# Patient Record
Sex: MALE | Race: WHITE | Employment: OTHER | URBAN - METROPOLITAN AREA
[De-identification: names, ages, dates, MRNs, and addresses within clinical notes are randomized per-mention and may not be internally consistent; named-entity substitution may affect disease eponyms.]

---

## 2017-06-19 ENCOUNTER — HOSPITAL ENCOUNTER (OUTPATIENT)
Dept: CT IMAGING | Age: 77
Discharge: HOME | End: 2017-06-19
Attending: OPHTHALMOLOGY

## 2017-06-19 DIAGNOSIS — H55.89 IRREGULAR EYE MOVEMENTS: ICD-10-CM

## 2017-06-19 DIAGNOSIS — Z98.890 STATUS POST EYE SURGERY: ICD-10-CM

## 2017-06-20 ENCOUNTER — HOSPITAL ENCOUNTER (OUTPATIENT)
Dept: CT IMAGING | Age: 77
Discharge: HOME | End: 2017-06-20
Attending: OPHTHALMOLOGY
Payer: MEDICARE

## 2017-06-20 DIAGNOSIS — Z98.890 STATUS POST EYE SURGERY: ICD-10-CM

## 2017-06-20 DIAGNOSIS — H55.89 IRREGULAR EYE MOVEMENTS: ICD-10-CM

## 2017-06-20 PROCEDURE — 70450 CT HEAD/BRAIN W/O DYE: CPT

## 2017-08-16 ENCOUNTER — HOSPITAL ENCOUNTER (EMERGENCY)
Age: 77
Discharge: HOME | End: 2017-08-16
Attending: EMERGENCY MEDICINE
Payer: MEDICARE

## 2017-08-16 VITALS
HEART RATE: 127 BPM | BODY MASS INDEX: 27.31 KG/M2 | HEIGHT: 67 IN | DIASTOLIC BLOOD PRESSURE: 97 MMHG | OXYGEN SATURATION: 96 % | SYSTOLIC BLOOD PRESSURE: 154 MMHG | WEIGHT: 174 LBS | RESPIRATION RATE: 14 BRPM | TEMPERATURE: 98.4 F

## 2017-08-16 DIAGNOSIS — M54.50 ACUTE LEFT-SIDED LOW BACK PAIN WITHOUT SCIATICA: Primary | ICD-10-CM

## 2017-08-16 PROCEDURE — 99282 EMERGENCY DEPT VISIT SF MDM: CPT

## 2017-08-16 RX ORDER — CYCLOBENZAPRINE HCL 10 MG
10 TABLET ORAL
Qty: 15 TAB | Refills: 0 | Status: SHIPPED | OUTPATIENT
Start: 2017-08-16 | End: 2017-08-23

## 2017-08-16 RX ORDER — ZINC GLUCONATE 10 MG
250 LOZENGE ORAL
COMMUNITY
End: 2019-08-14

## 2017-08-16 NOTE — ED TRIAGE NOTES
C/o left buttock pain for 1 week, states he was seen at [facility] one week ago for same complaint, given diclofenac with no relief. States he has been here before for same complaint and was dx'd with sciatica.

## 2017-08-16 NOTE — ED PROVIDER NOTES
[address]  EMERGENCY DEPARTMENT HISTORY AND PHYSICAL EXAM       Date: 8/16/2017   Patient Name: Eleanor Posey   YOB: 1940  Medical Record Number: [id number]    History of Presenting Illness     Chief Complaint   Patient presents with    Back Pain        History Provided By:  patient    Additional History: 11:39 AM  Eleanor Posey is a 78 y.o. male who presents to the emergency department C/O 9/10 lower left back pain onset 1 week ago. Pt states the pain may have come from twisting his back. Pain is alleviated with laying flat and exacerbated with moving. Pt explains that he went to [facility] last for the pain and they gave him a muscle relaxer which did not help. Pt was at this facility in the past with dx of sciatica and was given a medication which helped, but unsure of the name. Pt reports his HR is normally around 105. NKDA. PMHx of back problems, aneurysm, DM, kidney cysts, renal calculus, carotid atery aneurysm, PNA, and sciatica. PSHx of hernia, lithotripsy, and left ankle ligament. Pt is a non smoker and a non EtOH user. Pt denies trouble urinating, dysuria, bladder/bowel incontinence, weakness, leg pain, chest pain, SOB, palpitations, and any other associated signs or sx. Primary Care Provider: PROVIDER UNKNOWN   Specialist:    Past History     Past Medical History:   Past Medical History:   Diagnosis Date    Aneurysm ([address]     \" not sure where\"    Back problem     Carotid artery aneurysm ([address] 9/17/2014    Diabetes [facility] [address] 2007    Kidney cysts     Pneumonia     Renal calculus 2014    Rib contusion     Sciatica         Past Surgical History:   Past Surgical History:   Procedure Laterality Date    HX HERNIA REPAIR      HX LITHOTRIPSY  08/07/2014    HX ORTHOPAEDIC      left ankle ligament        Family History:   No family history on file.      Social History:   Social History   Substance Use Topics    Smoking status: Never Smoker    Smokeless tobacco: Never Used    Alcohol use No        Allergies:   No Known Allergies     Review of Systems   Review of Systems   Respiratory: Negative for shortness of breath. Cardiovascular: Negative for chest pain and palpitations. Genitourinary: Negative for difficulty urinating and dysuria. Musculoskeletal: Positive for back pain. (-) Leg pain   Neurological: Negative for weakness. (-) Bladder/bowel incontinence   All other systems reviewed and are negative. Physical Exam  Vitals:    08/16/17 1125   BP: (!) 154/97   Pulse: (!) 127   Resp: 14   Temp: 98.4 °F (36.9 °C)   SpO2: 96%   Weight: 78.9 kg (174 lb)   Height: 5' 7\" (1.702 m)       Physical Exam   Constitutional: He is oriented to person, place, and time. He appears well-developed and well-nourished. No distress. HENT:   Head: Normocephalic and atraumatic. Neck: Normal range of motion. Neck supple. Cardiovascular: Regular rhythm and normal heart sounds. Mildly tachycardic   Pulmonary/Chest: Effort normal and breath sounds normal. He exhibits no tenderness. Abdominal: Soft. He exhibits no distension. There is no tenderness. Musculoskeletal:        Back:    Neurological: He is alert and oriented to person, place, and time. Skin: Skin is warm and dry. No rash noted. He is not diaphoretic. No erythema. Psychiatric: He has a normal mood and affect. His behavior is normal.   Nursing note and vitals reviewed. Diagnostic Study Results     Labs -    No results found for this or any previous visit (from the past 12 hour(s)). Radiologic Studies -  The following have been ordered and reviewed:  No orders to display           Medical Decision Making   I am the first provider for this patient. I reviewed the vital signs, available nursing notes, past medical history, past surgical history, family history and social history. Vital Signs-Reviewed the patient's vital signs.    Patient Vitals for the past 12 hrs:   Temp Pulse Resp BP SpO2   08/16/17 1125 98.4 °F (36.9 °C) (!) 127 14 (!) 154/97 96 %       Pulse Oximetry Analysis - Normal 96% on room air     Old Medical Records: Nursing notes. MDM:   78year old male presents with a week of left buttock pain exacerbated by movement and pain free at rest. States he thinks he twisted it wrong, but no particular injury or fall. Had similar pain in 2013 for which I saw him in the ED and reported good relief with rx'ed Flexeril then. Denies any radicular sx, abdominal pain, midline spinous tenderness, rash, and is neurovascularly intact. He states his HR is always elevated. On recheck, it is 113. Review of previous notes including cardiologist notes show that his HR has regularly been in the 110s. Denies any CP, palpitations, SOB, hx of AFIB, syncope, or near syncope. There is also some question of aneurysm, but pt states this is incorrect, he was told by his cardiologist it is insignificant (CTA showed borderline thoracic aorta); sx not c/w with this. Procedures:   Procedures    ED Course:  11:39 AM  Initial assessment performed. The patients presenting problems have been discussed, and they are in agreement with the care plan formulated and outlined with them. I have encouraged them to ask questions as they arise throughout their visit. Medications Given in the ED:  Medications - No data to display    Discharge Note:  12:08 PM  Pt has been reexamined. Patient has no new complaints, changes, or physical findings. Care plan outlined and precautions discussed. Results were reviewed with the patient. All medications were reviewed with the patient; will d/c home with Flexiril. All of pt's questions and concerns were addressed. Patient was instructed and agrees to follow up with Ortho, as well as to return to the ED upon further deterioration. Patient is ready to go home. Diagnosis   Clinical Impression:   1.  Acute left-sided low back pain without sciatica         Follow-up Information     Follow up With Details Comments Contact Info    Linda Jiménez MD Schedule an appointment as soon as possible for a visit For Orthopedic Follow Up [address]  [address] [place] [address]      Mitch Belcher DO Schedule an appointment as soon as possible for a visit For Primary Care Follow Up [address]      [facility] EMERGENCY DE[facility] Go to As needed, If symptoms worsen [address] [place] [address]  [phone]          Discharge Medication List as of 8/16/2017 12:07 PM      START taking these medications    Details   cyclobenzaprine (FLEXERIL) 10 mg tablet Take 1 Tab by mouth three (3) times daily as needed for Muscle Spasm(s) for up to 7 days. , Print, Disp-15 Tab, R-0         CONTINUE these medications which have NOT CHANGED    Details   magnesium 250 mg tab Take 250 mg by mouth., Historical Med      metFORMIN (GLUCOPHAGE) 1,000 mg tablet Take 1,000 mg by mouth two (2) times daily (with meals). Historical Med, 1,000 mg      lisinopril (PRINIVIL, ZESTRIL) 5 mg tablet Take 5 mg by mouth daily. Historical Med, 5 mg      glipiZIDE SR (GLUCOTROL XL) 5 mg CR tablet Take  by mouth daily. Historical Med      aspirin 81 mg tablet Take 81 mg by mouth. Historical Med, 81 mg      co-enzyme Q-10 (CO Q-10) 100 mg capsule Take 100 mg by mouth daily. Historical Med, 100 mg             _______________________________   Attestations: This note is prepared by Mihaela Scott, acting as a Scribe for [facility] on 11:39 AM on 8/16/2017. [facility]: The scribe's documentation has been prepared under my direction and personally reviewed by me in its entirety.   _______________________________

## 2017-08-16 NOTE — DISCHARGE INSTRUCTIONS
Back Pain: Care Instructions  Your Care Instructions    Back pain has many possible causes. It is often related to problems with muscles and ligaments of the back. It may also be related to problems with the nerves, discs, or bones of the back. Moving, lifting, standing, sitting, or sleeping in an awkward way can strain the back. Sometimes you don't notice the injury until later. Arthritis is another common cause of back pain. Although it may hurt a lot, back pain usually improves on its own within several weeks. Most people recover in 12 weeks or less. Using good home treatment and being careful not to stress your back can help you feel better sooner. Follow-up care is a key part of your treatment and safety. Be sure to make and go to all appointments, and call your doctor if you are having problems. Its also a good idea to know your test results and keep a list of the medicines you take. How can you care for yourself at home? · Sit or lie in positions that are most comfortable and reduce your pain. Try one of these positions when you lie down:  ¨ Lie on your back with your knees bent and supported by large pillows. ¨ Lie on the floor with your legs on the seat of a sofa or chair. Jeff Amari on your side with your knees and hips bent and a pillow between your legs. ¨ Lie on your stomach if it does not make pain worse. · Do not sit up in bed, and avoid soft couches and twisted positions. Bed rest can help relieve pain at first, but it delays healing. Avoid bed rest after the first day of back pain. · Change positions every 30 minutes. If you must sit for long periods of time, take breaks from sitting. Get up and walk around, or lie in a comfortable position. · Try using a heating pad on a low or medium setting for 15 to 20 minutes every 2 or 3 hours. Try a warm shower in place of one session with the heating pad. · You can also try an ice pack for 10 to 15 minutes every 2 to 3 hours.  Put a thin cloth between the ice pack and your skin. · Take pain medicines exactly as directed. ¨ If the doctor gave you a prescription medicine for pain, take it as prescribed. ¨ If you are not taking a prescription pain medicine, ask your doctor if you can take an over-the-counter medicine. · Take short walks several times a day. You can start with 5 to 10 minutes, 3 or 4 times a day, and work up to longer walks. Walk on level surfaces and avoid hills and stairs until your back is better. · Return to work and other activities as soon as you can. Continued rest without activity is usually not good for your back. · To prevent future back pain, do exercises to stretch and strengthen your back and stomach. Learn how to use good posture, safe lifting techniques, and proper body mechanics. When should you call for help? Call your doctor now or seek immediate medical care if:  · You have new or worsening numbness in your legs. · You have new or worsening weakness in your legs. (This could make it hard to stand up.)  · You lose control of your bladder or bowels. Watch closely for changes in your health, and be sure to contact your doctor if:  · Your pain gets worse. · You are not getting better after 2 weeks. Where can you learn more? Go to http://hung-kassandra.info/. Enter I016 in the search box to learn more about \"Back Pain: Care Instructions. \"  Current as of: March 21, 2017  Content Version: 11.3  © 6814-0996 [employer]. Care instructions adapted under license by [employer] (which disclaims liability or warranty for this information). If you have questions about a medical condition or this instruction, always ask your healthcare professional. [address] any warranty or liability for your use of this information.

## 2018-08-15 ENCOUNTER — APPOINTMENT (OUTPATIENT)
Dept: GENERAL RADIOLOGY | Age: 78
End: 2018-08-15
Attending: EMERGENCY MEDICINE
Payer: MEDICARE

## 2018-08-15 ENCOUNTER — HOSPITAL ENCOUNTER (EMERGENCY)
Age: 78
Discharge: HOME | End: 2018-08-15
Attending: EMERGENCY MEDICINE
Payer: MEDICARE

## 2018-08-15 VITALS
OXYGEN SATURATION: 99 % | HEIGHT: 67 IN | SYSTOLIC BLOOD PRESSURE: 132 MMHG | BODY MASS INDEX: 25.58 KG/M2 | WEIGHT: 163 LBS | TEMPERATURE: 97.6 F | RESPIRATION RATE: 18 BRPM | DIASTOLIC BLOOD PRESSURE: 82 MMHG | HEART RATE: 87 BPM

## 2018-08-15 DIAGNOSIS — J18.9 COMMUNITY ACQUIRED PNEUMONIA OF RIGHT LOWER LOBE OF LUNG: Primary | ICD-10-CM

## 2018-08-15 LAB
ALBUMIN SERPL-MCNC: 3.4 G/DL (ref 3.4–5)
ALBUMIN/GLOB SERPL: 0.7 {RATIO} (ref 0.8–1.7)
ALP SERPL-CCNC: 77 U/L (ref 45–117)
ALT SERPL-CCNC: 23 U/L (ref 16–61)
ANION GAP SERPL CALC-SCNC: 6 MMOL/L (ref 3–18)
APTT PPP: 28.6 SEC (ref 23–36.4)
AST SERPL-CCNC: 11 U/L (ref 15–37)
BASOPHILS # BLD: 0 K/UL (ref 0–0.1)
BASOPHILS NFR BLD: 0 % (ref 0–2)
BILIRUB SERPL-MCNC: 0.4 MG/DL (ref 0.2–1)
BUN SERPL-MCNC: 22 MG/DL (ref 7–18)
BUN/CREAT SERPL: 15 (ref 12–20)
CALCIUM SERPL-MCNC: 10 MG/DL (ref 8.5–10.1)
CHLORIDE SERPL-SCNC: 98 MMOL/L (ref 100–108)
CK MB CFR SERPL CALC: NORMAL % (ref 0–4)
CK MB SERPL-MCNC: <1 NG/ML (ref 5–25)
CK SERPL-CCNC: 45 U/L (ref 39–308)
CO2 SERPL-SCNC: 29 MMOL/L (ref 21–32)
CREAT SERPL-MCNC: 1.48 MG/DL (ref 0.6–1.3)
D DIMER PPP FEU-MCNC: 0.59 UG/ML(FEU)
DIFFERENTIAL METHOD BLD: ABNORMAL
EOSINOPHIL # BLD: 0.2 K/UL (ref 0–0.4)
EOSINOPHIL NFR BLD: 2 % (ref 0–5)
ERYTHROCYTE [DISTWIDTH] IN BLOOD BY AUTOMATED COUNT: 12 % (ref 11.6–14.5)
GLOBULIN SER CALC-MCNC: 5.1 G/DL (ref 2–4)
GLUCOSE BLD STRIP.AUTO-MCNC: 242 MG/DL (ref 70–110)
GLUCOSE SERPL-MCNC: 239 MG/DL (ref 74–99)
HCT VFR BLD AUTO: 47.8 % (ref 36–48)
HGB BLD-MCNC: 16.7 G/DL (ref 13–16)
INR PPP: 0.9 (ref 0.8–1.2)
LYMPHOCYTES # BLD: 1.6 K/UL (ref 0.9–3.6)
LYMPHOCYTES NFR BLD: 16 % (ref 21–52)
MAGNESIUM SERPL-MCNC: 1.5 MG/DL (ref 1.6–2.6)
MCH RBC QN AUTO: 34.5 PG (ref 24–34)
MCHC RBC AUTO-ENTMCNC: 34.9 G/DL (ref 31–37)
MCV RBC AUTO: 98.8 FL (ref 74–97)
MONOCYTES # BLD: 1.1 K/UL (ref 0.05–1.2)
MONOCYTES NFR BLD: 11 % (ref 3–10)
NEUTS SEG # BLD: 7.3 K/UL (ref 1.8–8)
NEUTS SEG NFR BLD: 71 % (ref 40–73)
PLATELET # BLD AUTO: 268 K/UL (ref 135–420)
PMV BLD AUTO: 10 FL (ref 9.2–11.8)
POTASSIUM SERPL-SCNC: 4.1 MMOL/L (ref 3.5–5.5)
PROT SERPL-MCNC: 8.5 G/DL (ref 6.4–8.2)
PROTHROMBIN TIME: 12 SEC (ref 11.5–15.2)
RBC # BLD AUTO: 4.84 M/UL (ref 4.7–5.5)
SODIUM SERPL-SCNC: 133 MMOL/L (ref 136–145)
TROPONIN I SERPL-MCNC: <0.02 NG/ML (ref 0–0.06)
WBC # BLD AUTO: 10.2 K/UL (ref 4.6–13.2)

## 2018-08-15 PROCEDURE — 82550 ASSAY OF CK (CPK): CPT | Performed by: EMERGENCY MEDICINE

## 2018-08-15 PROCEDURE — 93005 ELECTROCARDIOGRAM TRACING: CPT

## 2018-08-15 PROCEDURE — 85610 PROTHROMBIN TIME: CPT | Performed by: EMERGENCY MEDICINE

## 2018-08-15 PROCEDURE — 85379 FIBRIN DEGRADATION QUANT: CPT | Performed by: EMERGENCY MEDICINE

## 2018-08-15 PROCEDURE — 99284 EMERGENCY DEPT VISIT MOD MDM: CPT

## 2018-08-15 PROCEDURE — 85730 THROMBOPLASTIN TIME PARTIAL: CPT | Performed by: EMERGENCY MEDICINE

## 2018-08-15 PROCEDURE — 80053 COMPREHEN METABOLIC PANEL: CPT | Performed by: EMERGENCY MEDICINE

## 2018-08-15 PROCEDURE — 71045 X-RAY EXAM CHEST 1 VIEW: CPT

## 2018-08-15 PROCEDURE — 85025 COMPLETE CBC W/AUTO DIFF WBC: CPT | Performed by: EMERGENCY MEDICINE

## 2018-08-15 PROCEDURE — 82962 GLUCOSE BLOOD TEST: CPT

## 2018-08-15 PROCEDURE — 83735 ASSAY OF MAGNESIUM: CPT | Performed by: EMERGENCY MEDICINE

## 2018-08-15 RX ORDER — LEVOFLOXACIN 750 MG/1
750 TABLET ORAL DAILY
Qty: 5 TAB | Refills: 0 | Status: SHIPPED | OUTPATIENT
Start: 2018-08-15 | End: 2019-08-14

## 2018-08-15 NOTE — LETTER
[facility] [place] 
[facility] 
[address] [address] 
[phone] Work/School Note Date: 8/15/2018 To Whom It May concern: 
 
Celena Sanchez was seen and treated today in the emergency room by the following provider(s): 
No providers found. Celena Sanchez may return to work on 8/20/18.  
 
Sincerely, 
 
 
 
 
Katherine Em MD

## 2018-08-15 NOTE — ED TRIAGE NOTES
Pt presents to ED with concerns that his blood sugar has been running approximately 300 at home for the past few days. Pt also concerned that he may have pneumonia because he has pain between his shoulder blades.

## 2018-08-15 NOTE — DISCHARGE INSTRUCTIONS
Pneumonia: Care Instructions  Your Care Instructions    Pneumonia is an infection of the lungs. Most cases are caused by infections from bacteria or viruses. Pneumonia may be mild or very severe. If it is caused by bacteria, you will be treated with antibiotics. It may take a few weeks to a few months to recover fully from pneumonia, depending on how sick you were and whether your overall health is good. Follow-up care is a key part of your treatment and safety. Be sure to make and go to all appointments, and call your doctor if you are having problems. It's also a good idea to know your test results and keep a list of the medicines you take. How can you care for yourself at home? · Take your antibiotics exactly as directed. Do not stop taking the medicine just because you are feeling better. You need to take the full course of antibiotics. · Take your medicines exactly as prescribed. Call your doctor if you think you are having a problem with your medicine. · Get plenty of rest and sleep. You may feel weak and tired for a while, but your energy level will improve with time. · To prevent dehydration, drink plenty of fluids, enough so that your urine is light yellow or clear like water. Choose water and other caffeine-free clear liquids until you feel better. If you have kidney, heart, or liver disease and have to limit fluids, talk with your doctor before you increase the amount of fluids you drink. · Take care of your cough so you can rest. A cough that brings up mucus from your lungs is common with pneumonia. It is one way your body gets rid of the infection. But if coughing keeps you from resting or causes severe fatigue and chest-wall pain, talk to your doctor. He or she may suggest that you take a medicine to reduce the cough. · Use a vaporizer or humidifier to add moisture to your bedroom. Follow the directions for cleaning the machine. · Do not smoke or allow others to smoke around you.  Smoke will make your cough last longer. If you need help quitting, talk to your doctor about stop-smoking programs and medicines. These can increase your chances of quitting for good. · Take an over-the-counter pain medicine, such as acetaminophen (Tylenol), ibuprofen (Advil, Motrin), or naproxen (Aleve). Read and follow all instructions on the label. · Do not take two or more pain medicines at the same time unless the doctor told you to. Many pain medicines have acetaminophen, which is Tylenol. Too much acetaminophen (Tylenol) can be harmful. · If you were given a spirometer to measure how well your lungs are working, use it as instructed. This can help your doctor tell how your recovery is going. · To prevent pneumonia in the future, talk to your doctor about getting a flu vaccine (once a year) and a pneumococcal vaccine (one time only for most people). When should you call for help? Call 911 anytime you think you may need emergency care. For example, call if:    · You have severe trouble breathing.    Call your doctor now or seek immediate medical care if:    · You cough up dark brown or bloody mucus (sputum).     · You have new or worse trouble breathing.     · You are dizzy or lightheaded, or you feel like you may faint.    Watch closely for changes in your health, and be sure to contact your doctor if:    · You have a new or higher fever.     · You are coughing more deeply or more often.     · You are not getting better after 2 days (48 hours).     · You do not get better as expected. Where can you learn more? Go to http://hung-kassandra.info/. Enter [id number] in the search box to learn more about \"Pneumonia: Care Instructions. \"  Current as of: December 6, 2017  Content Version: 11.7  © 9176-2461 [employer]. Care instructions adapted under license by [employer] (which disclaims liability or warranty for this information).  If you have questions about a medical condition or this instruction, always ask your healthcare professional. [place] [address] any warranty or liability for your use of this information.

## 2018-08-15 NOTE — ED PROVIDER NOTES
EMERGENCY DEPARTMENT HISTORY AND PHYSICAL EXAM    Date: 8/15/2018  Patient Name: Alejos Mohs    History of Presenting Illness     Chief Complaint   Patient presents with    High Blood Sugar    Back Pain         History Provided By: Patient    Chief Complaint: Back pain  Duration: 6 Days  Timing:  Improving  Severity: 5 out of 10  Associated Symptoms: DYSON, hyperglycemia    Additional History (Context):   10:23 AM  Alejos Mohs is a 70 y.o. male with PMHX NIDDM (for which he takes Metformin and Glipizide) who presents to the emergency department C/O upper back pain between his shoulder blades, onset 1 week ago and improving over the past 2-3 days. Associated sxs include DYSON and gradually worsening hyperglycemia that started a few days ago. FSBS this morning was 300. Baseline blood sugar is around 130-140. Pt notes he has had more difficulty controlling his sugars over the past few days and has been running in the high 100s-200s. Currently on a daily baby ASA. Pt denies use of tobacco/EtOH/illicit drugs, fever, chills, CP, n/v, and any other sxs or complaints. PCP: Iesha Nunez MD    Current Outpatient Prescriptions   Medication Sig Dispense Refill    levoFLOXacin (LEVAQUIN) 750 mg tablet Take 1 Tab by mouth daily. 5 Tab 0    magnesium 250 mg tab Take 250 mg by mouth.  metFORMIN (GLUCOPHAGE) 1,000 mg tablet Take 1,000 mg by mouth two (2) times daily (with meals).  lisinopril (PRINIVIL, ZESTRIL) 5 mg tablet Take 5 mg by mouth daily.  glipiZIDE SR (GLUCOTROL XL) 5 mg CR tablet Take  by mouth daily.  aspirin 81 mg tablet Take 81 mg by mouth.  co-enzyme Q-10 (CO Q-10) 100 mg capsule Take 100 mg by mouth daily.          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 [address] [facility] 2007    Kidney cysts     Pneumonia     Renal calculus 2014    Rib contusion     Sciatica        Past Surgical History:  Past Surgical History:   Procedure Laterality Date    HX HERNIA REPAIR      HX LITHOTRIPSY  08/07/2014    HX ORTHOPAEDIC      left ankle ligament       Family History:  History reviewed. No pertinent family history. Social History:  Social History   Substance Use Topics    Smoking status: Never Smoker    Smokeless tobacco: Never Used    Alcohol use No       Allergies:  No Known Allergies      Review of Systems   Review of Systems   Constitutional: Negative for chills and fever. Respiratory: Positive for shortness of breath. Negative for cough. Gastrointestinal: Negative for nausea and vomiting. Endocrine:        (+) hyperglycemia   Musculoskeletal: Positive for back pain (upper ). All other systems reviewed and are negative. Physical Exam     Vitals:    08/15/18 1013   BP: 143/85   Pulse: 91   Resp: 16   Temp: 97.5 °F (36.4 °C)   SpO2: 98%   Weight: 73.9 kg (163 lb)   Height: 5' 7\" (1.702 m)     Physical Exam   Nursing note and vitals reviewed. Vital signs and nursing notes reviewed    CONSTITUTIONAL: Alert, in no apparent distress; well-developed; well-nourished. HEAD:  Normocephalic, atraumatic  EYES: PERRL; EOM's intact. ENTM: Nose: no rhinorrhea; Throat: no erythema or exudate, mucous membranes moist  Neck:  No JVD, supple without lymphadenopathy  RESP: Chest clear, equal breath sounds. CV: S1 and S2 WNL; No murmurs, gallops or rubs. GI: Normal bowel sounds, abdomen soft and non-tender. No masses or organomegaly. : No costo-vertebral angle tenderness. BACK:  Non-tender  UPPER EXT:  Normal inspection  LOWER EXT: No edema, no calf tenderness. Distal pulses intact. NEURO: CN intact, reflexes 2/4 and sym, strength 5/5 and sym, sensation intact. SKIN: No rashes; Normal for age and stage. PSYCH:  Alert and oriented, normal affect.        Diagnostic Study Results     Labs -     Recent Results (from the past 12 hour(s)) GLUCOSE, POC    Collection Time: 08/15/18 10:16 AM   Result Value Ref Range    Glucose (POC) 242 (H) 70 - 110 mg/dL   EKG, 12 LEAD, INITIAL    Collection Time: 08/15/18 10:46 AM   Result Value Ref Range    Ventricular Rate 86 BPM    Atrial Rate 86 BPM    P-R Interval 148 ms    QRS Duration 74 ms    Q-T Interval 342 ms    QTC Calculation (Bezet) 409 ms    Calculated P Axis -27 degrees    Calculated R Axis 9 degrees    Calculated T Axis 47 degrees    Diagnosis       Normal sinus rhythm  Normal ECG  When compared with ECG of 27-JUL-2013 08:00,  QT has shortened     CBC WITH AUTOMATED DIFF    Collection Time: 08/15/18 10:57 AM   Result Value Ref Range    WBC 10.2 4.6 - 13.2 K/uL    RBC 4.84 4.70 - 5.50 M/uL    HGB 16.7 (H) 13.0 - 16.0 g/dL    HCT 47.8 36.0 - 48.0 %    MCV 98.8 (H) 74.0 - 97.0 FL    MCH 34.5 (H) 24.0 - 34.0 PG    MCHC 34.9 31.0 - 37.0 g/dL    RDW 12.0 11.6 - 14.5 %    PLATELET 490 021 - 382 K/uL    MPV 10.0 9.2 - 11.8 FL    NEUTROPHILS 71 40 - 73 %    LYMPHOCYTES 16 (L) 21 - 52 %    MONOCYTES 11 (H) 3 - 10 %    EOSINOPHILS 2 0 - 5 %    BASOPHILS 0 0 - 2 %    ABS. NEUTROPHILS 7.3 1.8 - 8.0 K/UL    ABS. LYMPHOCYTES 1.6 0.9 - 3.6 K/UL    ABS. MONOCYTES 1.1 0.05 - 1.2 K/UL    ABS. EOSINOPHILS 0.2 0.0 - 0.4 K/UL    ABS.  BASOPHILS 0.0 0.0 - 0.1 K/UL    DF AUTOMATED     PROTHROMBIN TIME + INR    Collection Time: 08/15/18 10:57 AM   Result Value Ref Range    Prothrombin time 12.0 11.5 - 15.2 sec    INR 0.9 0.8 - 1.2     D DIMER    Collection Time: 08/15/18 10:57 AM   Result Value Ref Range    D DIMER 0.59 (H) <0.46 ug/ml(FEU)   METABOLIC PANEL, COMPREHENSIVE    Collection Time: 08/15/18 10:57 AM   Result Value Ref Range    Sodium 133 (L) 136 - 145 mmol/L    Potassium 4.1 3.5 - 5.5 mmol/L    Chloride 98 (L) 100 - 108 mmol/L    CO2 29 21 - 32 mmol/L    Anion gap 6 3.0 - 18 mmol/L    Glucose 239 (H) 74 - 99 mg/dL    BUN 22 (H) 7.0 - 18 MG/DL    Creatinine 1.48 (H) 0.6 - 1.3 MG/DL    BUN/Creatinine ratio 15 12 - 20      GFR est AA 56 (L) >60 ml/min/1.73m2    GFR est non-AA 46 (L) >60 ml/min/1.73m2    Calcium 10.0 8.5 - 10.1 MG/DL    Bilirubin, total 0.4 0.2 - 1.0 MG/DL    ALT (SGPT) 23 16 - 61 U/L    AST (SGOT) 11 (L) 15 - 37 U/L    Alk. phosphatase 77 45 - 117 U/L    Protein, total 8.5 (H) 6.4 - 8.2 g/dL    Albumin 3.4 3.4 - 5.0 g/dL    Globulin 5.1 (H) 2.0 - 4.0 g/dL    A-G Ratio 0.7 (L) 0.8 - 1.7     MAGNESIUM    Collection Time: 08/15/18 10:57 AM   Result Value Ref Range    Magnesium 1.5 (L) 1.6 - 2.6 mg/dL   PTT    Collection Time: 08/15/18 10:57 AM   Result Value Ref Range    aPTT 28.6 23.0 - 36.4 SEC   CARDIAC PANEL,(CK, CKMB & TROPONIN)    Collection Time: 08/15/18 10:57 AM   Result Value Ref Range    CK 45 39 - 308 U/L    CK - MB <1.0 <3.6 ng/ml    CK-MB Index  0.0 - 4.0 %     CALCULATION NOT PERFORMED WHEN RESULT IS BELOW LINEAR LIMIT    Troponin-I, Qt. <0.02 0.00 - 0.06 NG/ML       Radiologic Studies -     11:26 AM  RADIOLOGY FINDINGS  Chest X-ray shows right lower lobe focal consolidation  Pending review by Radiologist  Recorded by Colt Singer, ED Scribe, as dictated by Romain Salas MD    XR CHEST PORT    (Results Pending)     CT Results  (Last 48 hours)    None        CXR Results  (Last 48 hours)    None          Medications given in the ED-  Medications - No data to display      Medical Decision Making   I am the first provider for this patient. I reviewed the vital signs, available nursing notes, past medical history, past surgical history, family history and social history. Vital Signs-Reviewed the patient's vital signs.     Pulse Oximetry Analysis - 98% on RA     EKG interpretation: (Preliminary)  10:46 AM   Normal sinus rhythm, normal ECG, non-STEMI, HR: 86 bpm  EKG read by Romain Salas MD at 10:46 AM     Records Reviewed: Nursing Notes    Provider Notes (Medical Decision Making):   DDX: URI, pneumonia, CAD, unlikely PE    History  0: Slightly suspicious  EKG  0: Normal  Age  2: Over 65  Risk Factors  1: 1-2  Risk Factors:  Hypertension, Diabetes Mellitus  Troponin  0: Negative     Total: 3    0-3: Low risk: less than 2% risk of Major Adverse Cardiac Event at 6 weeks. 4-6: Intermediate risk: 12-16.6% risk of Major Adverse Cardiac Event at 6 weeks, requires further management or admission  7-10: High risk: 50-65% risk of Major Adverse Cardiac Event at 6 weeks, requires further management or admission. Wells Score:  0: Clinical Signs and symptoms of DVT  0: PE is #1 diagnosis or equally as likely  0: HR > 100  0: Surgery in previous 4 weeks, or immobilization for 3 days  0: Previously diagnosed PE/DVT  0: Hemoptysis  0: Malignancy with treatment within 6 months or palliative care  0 Total    < 2: Low Risk  2-6: Intermediate Risk  > 6: High Risk    PERC Positive for (Fails the following criteria):  Age < 50  No Exogenous Estrogen (Testosterone)    Procedures:  Procedures    ED Course:   10:23 AM Initial assessment performed. The patients presenting problems have been discussed, and they are in agreement with the care plan formulated and outlined with them. I have encouraged them to ask questions as they arise throughout their visit. 11:45 AM CURB 65 score: 2 for BUN and age, placing him in a moderate risk group, but still suitable for outpatient treatment with close follow up, which we will do provided his Troponin is negative. 12:15 PM Discussed results with pt, informed pt he has pneumonia. Pt is willing to follow up with PCP for this; requests written prescription so he can follow up at [facility]. Diagnosis and Disposition     Discussion: Pt's D-Dimer was slightly elevated; however, when corrected for age was below the cut-off for work up for pulmonary embolism. Given slightly elevated WBC and elevated neutrophils, pt is most consistent with pneumonia, and his CXR is consistent with this. Pt is comfortable with being treated as an outpatient for this condition.      DISCHARGE NOTE:  12:24 PM  Darrel Jiang's  results have been reviewed with him. He has been counseled regarding his diagnosis, treatment, and plan. He verbally conveys understanding and agreement of the signs, symptoms, diagnosis, treatment and prognosis and additionally agrees to follow up as discussed. He also agrees with the care-plan and conveys that all of his questions have been answered. I have also provided discharge instructions for him that include: educational information regarding their diagnosis and treatment, and list of reasons why they would want to return to the ED prior to their follow-up appointment, should his condition change. He has been provided with education for proper emergency department utilization. CLINICAL IMPRESSION:    1. Community acquired pneumonia of right lower lobe of lung ([address]        PLAN:  1. D/C Home  2. Current Discharge Medication List      START taking these medications    Details   levoFLOXacin (LEVAQUIN) 750 mg tablet Take 1 Tab by mouth daily. Qty: 5 Tab, Refills: 0         CONTINUE these medications which have NOT CHANGED    Details   magnesium 250 mg tab Take 250 mg by mouth.      metFORMIN (GLUCOPHAGE) 1,000 mg tablet Take 1,000 mg by mouth two (2) times daily (with meals). lisinopril (PRINIVIL, ZESTRIL) 5 mg tablet Take 5 mg by mouth daily. glipiZIDE SR (GLUCOTROL XL) 5 mg CR tablet Take  by mouth daily. aspirin 81 mg tablet Take 81 mg by mouth. co-enzyme Q-10 (CO Q-10) 100 mg capsule Take 100 mg by mouth daily. 3.   Follow-up Information     Follow up With Details Comments Contact Info    REMY In 2 days Primary care follow up [phone]    [facility] EMERGENCY DEPT Go to As needed, If symptoms worsen [address] [id number]  [phone]        _______________________________    Attestations:   This note is prepared by Sandeep Zavaleta and Lizet Rivera, acting as Scribe for Sommer Balbuena MD.    Sommer Balbuena, MD:  The scribe's documentation has been prepared under my direction and personally reviewed by me in its entirety.   I confirm that the note above accurately reflects all work, treatment, procedures, and medical decision making performed by me.  _______________________________

## 2018-09-02 LAB
ATRIAL RATE: 86 BPM
CALCULATED P AXIS, ECG09: -27 DEGREES
CALCULATED R AXIS, ECG10: 9 DEGREES
CALCULATED T AXIS, ECG11: 47 DEGREES
DIAGNOSIS, 93000: NORMAL
P-R INTERVAL, ECG05: 148 MS
Q-T INTERVAL, ECG07: 342 MS
QRS DURATION, ECG06: 74 MS
QTC CALCULATION (BEZET), ECG08: 409 MS
VENTRICULAR RATE, ECG03: 86 BPM

## 2018-11-05 ENCOUNTER — HOSPITAL ENCOUNTER (OUTPATIENT)
Dept: LAB | Age: 78
Discharge: HOME | End: 2018-11-05
Payer: MEDICARE

## 2018-11-05 LAB
BUN SERPL-MCNC: 17 MG/DL (ref 7–18)
CREAT SERPL-MCNC: 1.47 MG/DL (ref 0.6–1.3)

## 2018-11-05 PROCEDURE — 82565 ASSAY OF CREATININE: CPT | Performed by: INTERNAL MEDICINE

## 2018-11-05 PROCEDURE — 84520 ASSAY OF UREA NITROGEN: CPT | Performed by: INTERNAL MEDICINE

## 2018-11-05 PROCEDURE — 36415 COLL VENOUS BLD VENIPUNCTURE: CPT | Performed by: INTERNAL MEDICINE

## 2018-11-06 LAB
FAX TO INFO,FAXT: NORMAL
FAX TO NUMBER,FAXN: NORMAL

## 2018-11-13 ENCOUNTER — HOSPITAL ENCOUNTER (OUTPATIENT)
Dept: CT IMAGING | Age: 78
Discharge: HOME | End: 2018-11-13
Attending: INTERNAL MEDICINE
Payer: MEDICARE

## 2018-11-13 DIAGNOSIS — I71.20 THORACIC AORTIC ANEURYSM WITHOUT RUPTURE: ICD-10-CM

## 2018-11-13 PROCEDURE — 74011636320 HC RX REV CODE- 636/320: Performed by: INTERNAL MEDICINE

## 2018-11-13 PROCEDURE — 74175 CTA ABDOMEN W/CONTRAST: CPT

## 2018-11-13 RX ADMIN — IOPAMIDOL 100 ML: 755 INJECTION, SOLUTION INTRAVENOUS at 13:37

## 2019-07-16 ENCOUNTER — HOSPITAL ENCOUNTER (OUTPATIENT)
Dept: CT IMAGING | Age: 79
Discharge: HOME | End: 2019-07-16
Attending: UROLOGY
Payer: MEDICARE

## 2019-07-16 DIAGNOSIS — R31.0 GROSS HEMATURIA: ICD-10-CM

## 2019-07-16 LAB — CREAT UR-MCNC: 1.4 MG/DL (ref 0.6–1.3)

## 2019-07-16 PROCEDURE — 82565 ASSAY OF CREATININE: CPT

## 2019-07-16 PROCEDURE — 74011636320 HC RX REV CODE- 636/320: Performed by: UROLOGY

## 2019-07-16 PROCEDURE — 74178 CT ABD&PLV WO CNTR FLWD CNTR: CPT

## 2019-07-16 RX ADMIN — IOPAMIDOL 100 ML: 755 INJECTION, SOLUTION INTRAVENOUS at 13:30

## 2019-08-14 ENCOUNTER — HOSPITAL ENCOUNTER (OUTPATIENT)
Dept: PREADMISSION TESTING | Age: 79
Discharge: HOME | End: 2019-08-14
Payer: MEDICARE

## 2019-08-14 LAB
ANION GAP SERPL CALC-SCNC: 9 MMOL/L (ref 3–18)
BUN SERPL-MCNC: 27 MG/DL (ref 7–18)
BUN/CREAT SERPL: 17 (ref 12–20)
CALCIUM SERPL-MCNC: 10 MG/DL (ref 8.5–10.1)
CHLORIDE SERPL-SCNC: 102 MMOL/L (ref 100–111)
CO2 SERPL-SCNC: 29 MMOL/L (ref 21–32)
CREAT SERPL-MCNC: 1.63 MG/DL (ref 0.6–1.3)
ERYTHROCYTE [DISTWIDTH] IN BLOOD BY AUTOMATED COUNT: 12.6 % (ref 11.6–14.5)
EST. AVERAGE GLUCOSE BLD GHB EST-MCNC: 166 MG/DL
GLUCOSE SERPL-MCNC: 134 MG/DL (ref 74–99)
HBA1C MFR BLD: 7.4 % (ref 4.2–5.6)
HCT VFR BLD AUTO: 49.2 % (ref 36–48)
HGB BLD-MCNC: 16.6 G/DL (ref 13–16)
MCH RBC QN AUTO: 34.2 PG (ref 24–34)
MCHC RBC AUTO-ENTMCNC: 33.7 G/DL (ref 31–37)
MCV RBC AUTO: 101.2 FL (ref 74–97)
PLATELET # BLD AUTO: 248 K/UL (ref 135–420)
PMV BLD AUTO: 10.8 FL (ref 9.2–11.8)
POTASSIUM SERPL-SCNC: 4.7 MMOL/L (ref 3.5–5.5)
RBC # BLD AUTO: 4.86 M/UL (ref 4.7–5.5)
SODIUM SERPL-SCNC: 140 MMOL/L (ref 136–145)
WBC # BLD AUTO: 10.1 K/UL (ref 4.6–13.2)

## 2019-08-14 PROCEDURE — 36415 COLL VENOUS BLD VENIPUNCTURE: CPT

## 2019-08-14 PROCEDURE — 83036 HEMOGLOBIN GLYCOSYLATED A1C: CPT

## 2019-08-14 PROCEDURE — 93005 ELECTROCARDIOGRAM TRACING: CPT

## 2019-08-14 PROCEDURE — 85027 COMPLETE CBC AUTOMATED: CPT

## 2019-08-14 PROCEDURE — 80048 BASIC METABOLIC PNL TOTAL CA: CPT

## 2019-08-15 LAB
ATRIAL RATE: 80 BPM
CALCULATED P AXIS, ECG09: 55 DEGREES
CALCULATED R AXIS, ECG10: 35 DEGREES
CALCULATED T AXIS, ECG11: 64 DEGREES
DIAGNOSIS, 93000: NORMAL
P-R INTERVAL, ECG05: 182 MS
Q-T INTERVAL, ECG07: 358 MS
QRS DURATION, ECG06: 86 MS
QTC CALCULATION (BEZET), ECG08: 412 MS
VENTRICULAR RATE, ECG03: 80 BPM

## 2019-08-28 NOTE — H&P
Urology [address] [place]  [address]  Tel: [phone]  Fax: [phone]        Patient: Adrianna Hurd  YOB: 1940  Date: 08/05/2019 9:30 AM   Visit Type: Office Visit        Assessment/Plan  # Detail Type Description    1. Assessment Gross hematuria (R31.0). Patient Plan Pt is here today for a f/u he's here today to complete a hematuria w/u. A urine cytology obtained prior showed no evidence of malignant cells. CT scan at [facility] revealed several small stones in the left kidney as well as an 8x6 distal ureteral stone and some thickening of the left renal pelvis. I reviewed these findings with the pt the radiologist was concerned for possible TCC of the renal pelvis although there is no evidence of any enhancement. I explained these findings to the pt in detail. I recommended he undergo a cysto, ureteroscopic stone extraction with Holmium laser fragmentation and stent as well as possible bx of the renal pelvis. All risks including pain, infection, bleeding, stent irritation, ureteral injury were reviewed he understands and would like to proceed. 2. Assessment Calculus of kidney (N20.0). 3. Assessment Calculus of ureter (N20.1). 4. Other Orders Orders not associated to today's assessments. Plan Orders The patient had the following test(s) completed today: CT Abdomen/Pelvis. This 70year old male presents for Kidney and/or Ureteral Stone, Erectile Dysfunction, Elevated PSA and Hematuria. History of Present Illness:  1. Kidney and/or Ureteral Stone   Onset was sudden. The problem is resolved. The pain does not radiate. Prior stone type of unknown. Pertinent history includes history of prior stone(s) and past lithotripsy. Associated symptoms include hematuria. Pertinent negatives include chills, constipation, diarrhea, fever, nausea, pain and vomiting. Additional information: He had ESWL in late 1990''s.   He began having RIGHT flank pain and went to [facility]  He was dx with  pneumonia. He was also noted on CT scan to have bilateral stone no obstruction. Stones in LK 5.4 and 8.3mm. He underwent repeat ESWL 8/28/14 and has passed alot of fragments KUB small stone UVJ and kidney. At this time no treatment neeeded. He underwent 24hr UA x 2 previously,  The study revealed low  volume, low citrate, high NaCl. 7/2/19 No reviewed w pt. 2.  Erectile Dysfunction   The symptoms began gradually, have been mild and are improving. The patient is here today for a follow up visit. The patient states he does have difficultly attaining an erection and has difficulty maintaining an erection. Pertinent history includes history of diabetes and hypertension but not prostate cancer. Reviewed today was a Hematocrit taken on 03/05/2019 with findings of 50%. He also complains of difficulty achieving erections, difficulty maintaining erections, morning erections and nocturnal erections but denies depression. Additional information: 7/2/19 No reviewed w pt  Pt with multiple sxs of low testosterone he is on 400mg every 2weeks. His HCT is stable 50%  He needs Rx for Testosterone. 3.  Elevated PSA   The onset of symptoms was sudden. The problem has improved. Pertinent history includes age over 48. The patient does not report modifying factors such as medication, instrumentation, recent ejaculation or recent 5 ADEOLA. Associated symptoms include hematuria. Additional information: 3/12/19: not reviewed today. Pt also has sudden rise in PSA at last visit to 4.130 and a repeat is now down to 2.17 which is baseline. 7/2/19 No reviewed w pt. 4.  Hematuria   The patient presents with initial hematuria (gross). The problem began suddenly and occurs every 1-6 hours. The patient reports 2 episodes over the last 6 month(s). The problem has resolved. He denies pain.  Pertinent history includes being at least 38years of age but not history of prostate cancer, history of UTIs or family history of stones. Denies relieving factors. He denies chills, fever, nausea and vomiting. Additional information: Pt had one episode of gross hematuria. Initial in nature. This occurred 3-4 mo ago. Painless. microscopic blood yesterday at pt first.   Stopped smoking 30-40 yrs ago.                   PAST MEDICAL/SURGICAL HISTORY   (Reviewed, updated)    Disease/disorder Onset Date Management Date Comments     ESWL LK stone 08/28/2014      Herniorrhaphy 2013          PROBLEM LIST:     Problem Description Onset Date Chronic Clinical Status Notes   Hypertension  Y     Type 2 diabetes mellitus w/o complication  Y     Hyperlipidemia  N     Low testosterone level  Y     BPH - Benign prostatic hypertrophy  Y     Erectile dysfunction  N     Premature ejaculation  N     Kidney stone 07/29/2014 N     Kidney cyst 07/29/2014 N     Hematuria  N           Medications (active prior to today)  Medication Instructions Start Date Stop Date Refilled Elsewhere   metformin 1,000 mg tablet take 1 tablet by oral route 2 times every day with morning and evening meals 07/29/2014   N   lisinopril 5 mg tablet take 1 tablet by oral route  every day 07/29/2014   N   coenzyme Q10 100 mg capsule  07/29/2014   N   glipizide 5 mg tablet take 1 tablet by oral route 2 times every day before meals 12/23/2014   N   Vitamin D3 5,000 unit tablet  //   Y   magnesium oxide 500 mg tablet  //   Y   Vitamin B-12 1,000 mcg tablet take 1 capsuke by mouth by mouth once daily 12/06/2017   N   metoprolol tartrate 25 mg tablet take 1 tablet by oral route 2 times every day 10/24/2018  10/24/2018 N   testosterone cypionate 200 mg/mL intramuscular oil inject 2 milliliter by Intramuscular route  every 3 weeks 03/12/2019 08/05/2019 08/05/2019 N   Viagra 100 mg tablet take 1 tablet PO as needed approximately 1 hour before sexual activity on empty stomach 03/12/2019   N       Allergies  Ingredient Reaction (Severity) Medication Name Comment   NO KNOWN ALLERGIES        Family History  (Reviewed, updated)  Relationship Family Member Name  Age at Death Condition Onset Age Cause of Death       Family history of Diabetes mellitus  N   Father    heart attack  N         Social History:  (Reviewed, updated)  Preferred language is [place]. EDUCATION/EMPLOYMENT/OCCUPATION  The patient has a(n) college graduate education. Earned a Bachelor's degree. Employment History Status Retired Restrictions    [employer] retired       MARITAL STATUS/FAMILY/SOCIAL SUPPORT  Currently . Smoking status: Former smoker. TOBACCO/VAPING EXPOSURE  No passive smoke exposure. ALCOHOL  There is no history of alcohol use. CAFFEINE  The patient uses caffeine: coffee - 1 cup a day. Alevism/SPIRITUAL  The patient does not have a Worship affiliation. Patient agrees to transfusion. Review of Systems  System Neg/Pos Details   Constitutional Negative Chills, Fever and Pain. GI Negative Constipation, Diarrhea, Nausea and Vomiting.  Positive Difficulty achieving erections, Difficulty maintaining erections, Hematuria, Morning erections, Nocturnal erections. Psych Negative Depression. Vital Signs     Height  Time ft in cm Last Measured Height Position   9:39 AM 5.0 7.00 170.18 2019 Standing     Weight/BSA/BMI  Time lb oz kg Context BMI kg/m2 BSA m2   9:39 .00  75.750  26.16      Measured By  Time Measured by   9:39 AM Roger Falter       Physical Exam  Exam Findings Details   Constitutional Normal Well developed. Neck Exam Normal Inspection - Normal.   Respiratory Normal Inspection - Normal.   Genitourinary Normal Pubic hair - Normal. Penis - Normal. Urethral meatus - Normal. Scrotum - Normal.   Extremity Normal No edema. Neurological Normal Alert and oriented to person, place and time. Cranial nerves intact. No motor or sensory deficits.    Psychiatric Normal Orientation - Oriented to time, place, person & situation. Appropriate mood and affect. Procedures:    Cystoscopy indication:  Bladder. Patient consent:  Consent was obtained. The procedure and risks were explained in detail. Questions were encouraged and answered. The patient was prepped and draped in the usual sterile fashion. Procedure:  A diagnostic cystourethroscopy was performed using a 16 French flexible cystoscope  Anesthesia:  No anesthesia. Patient position:  Dorsal lithotomy. Patient response:  Patient tolerated procedure well. Patient was given instructions. Patient was discharged in stable condition. Findings:   Prostatic urethra bilobar prostatic enlargement with the median lobe absent. The bladder is normal in appearance. Ureteral orifices normal in appearance. Antibiotics:  No antibiotics given. Impression:  Gross hematuria R31.0.       Medications (added, continued, or stopped today)  Start Date Medication Directions PRN Status PRN Reason Instruction Stop Date   07/29/2014 coenzyme Q10 100 mg capsule  N      12/23/2014 glipizide 5 mg tablet take 1 tablet by oral route 2 times every day before meals N      07/29/2014 lisinopril 5 mg tablet take 1 tablet by oral route  every day N       magnesium oxide 500 mg tablet  N      07/29/2014 metformin 1,000 mg tablet take 1 tablet by oral route 2 times every day with morning and evening meals N      10/24/2018 metoprolol tartrate 25 mg tablet take 1 tablet by oral route 2 times every day N      03/12/2019 testosterone cypionate 200 mg/mL intramuscular oil inject 2 milliliter by Intramuscular route  every 3 weeks N   08/05/2019 08/05/2019 testosterone cypionate 200 mg/mL intramuscular oil inject 2 milliliter by Intramuscular route  every 3 weeks N      03/12/2019 Viagra 100 mg tablet take 1 tablet PO as needed approximately 1 hour before sexual activity on empty stomach N      12/06/2017 Vitamin B-12 1,000 mcg tablet take 1 capsuke by mouth by mouth once daily N Vitamin D3 5,000 unit tablet  N      Active Patient Care Team Members    Name Contact Agency Type Support Role Relationship Active Date Inactive Date Specialty   Noe De La Cruz   encounter provider    Urology   Gideon Kothari   specialist       Gideon Kothari   primary care provider       Ivan Garza    Spouse      Alexy Browning   Patient provider PCP          Provider: Noe De La Cruz MD 08/05/2019 09:30 AM  Document generated by:  Grazyna Sylvester 08/28/2019 08:00 PM

## 2019-08-29 ENCOUNTER — ANESTHESIA (OUTPATIENT)
Dept: SURGERY | Age: 79
End: 2019-08-29
Payer: MEDICARE

## 2019-08-29 ENCOUNTER — HOSPITAL ENCOUNTER (OUTPATIENT)
Age: 79
Setting detail: OUTPATIENT SURGERY
Discharge: HOME | End: 2019-08-29
Attending: UROLOGY | Admitting: UROLOGY
Payer: MEDICARE

## 2019-08-29 ENCOUNTER — ANESTHESIA EVENT (OUTPATIENT)
Dept: SURGERY | Age: 79
End: 2019-08-29
Payer: MEDICARE

## 2019-08-29 ENCOUNTER — APPOINTMENT (OUTPATIENT)
Dept: GENERAL RADIOLOGY | Age: 79
End: 2019-08-29
Attending: UROLOGY
Payer: MEDICARE

## 2019-08-29 VITALS
OXYGEN SATURATION: 99 % | HEIGHT: 67 IN | DIASTOLIC BLOOD PRESSURE: 70 MMHG | WEIGHT: 168.19 LBS | BODY MASS INDEX: 26.4 KG/M2 | SYSTOLIC BLOOD PRESSURE: 119 MMHG | RESPIRATION RATE: 15 BRPM | TEMPERATURE: 98.1 F | HEART RATE: 86 BPM

## 2019-08-29 LAB
GLUCOSE BLD STRIP.AUTO-MCNC: 156 MG/DL (ref 70–110)
GLUCOSE BLD STRIP.AUTO-MCNC: 89 MG/DL (ref 70–110)

## 2019-08-29 PROCEDURE — 82360 CALCULUS ASSAY QUANT: CPT

## 2019-08-29 PROCEDURE — 74011636320 HC RX REV CODE- 636/320: Performed by: UROLOGY

## 2019-08-29 PROCEDURE — 76210000006 HC OR PH I REC 0.5 TO 1 HR: Performed by: UROLOGY

## 2019-08-29 PROCEDURE — C1758 CATHETER, URETERAL: HCPCS | Performed by: UROLOGY

## 2019-08-29 PROCEDURE — 77030014023 HC SYR INFL LVEEN BSC -B: Performed by: UROLOGY

## 2019-08-29 PROCEDURE — 77030005526 HC CATH URETH FOL 2W COVD –A: Performed by: UROLOGY

## 2019-08-29 PROCEDURE — C2617 STENT, NON-COR, TEM W/O DEL: HCPCS | Performed by: UROLOGY

## 2019-08-29 PROCEDURE — 74011250636 HC RX REV CODE- 250/636: Performed by: UROLOGY

## 2019-08-29 PROCEDURE — 74011636637 HC RX REV CODE- 636/637: Performed by: ANESTHESIOLOGY

## 2019-08-29 PROCEDURE — 74011250637 HC RX REV CODE- 250/637: Performed by: ANESTHESIOLOGY

## 2019-08-29 PROCEDURE — 74011250636 HC RX REV CODE- 250/636

## 2019-08-29 PROCEDURE — 76210000020 HC REC RM PH II FIRST 0.5 HR: Performed by: UROLOGY

## 2019-08-29 PROCEDURE — 77030006974 HC BSKT URET RTVR BSC -C: Performed by: UROLOGY

## 2019-08-29 PROCEDURE — 74420 UROGRAPHY RTRGR +-KUB: CPT

## 2019-08-29 PROCEDURE — 77030012508 HC MSK AIRWY LMA AMBU -A: Performed by: NURSE ANESTHETIST, CERTIFIED REGISTERED

## 2019-08-29 PROCEDURE — 77030020782 HC GWN BAIR PAWS FLX 3M -B: Performed by: UROLOGY

## 2019-08-29 PROCEDURE — 82962 GLUCOSE BLOOD TEST: CPT

## 2019-08-29 PROCEDURE — 77030010103 HC SEAL PRT ENDOSC OCOA -B: Performed by: UROLOGY

## 2019-08-29 PROCEDURE — C1769 GUIDE WIRE: HCPCS | Performed by: UROLOGY

## 2019-08-29 PROCEDURE — C1894 INTRO/SHEATH, NON-LASER: HCPCS | Performed by: UROLOGY

## 2019-08-29 PROCEDURE — 76010000149 HC OR TIME 1 TO 1.5 HR: Performed by: UROLOGY

## 2019-08-29 PROCEDURE — 77030018836 HC SOL IRR NACL ICUM -A: Performed by: UROLOGY

## 2019-08-29 PROCEDURE — 77030040361 HC SLV COMPR DVT MDII -B: Performed by: UROLOGY

## 2019-08-29 PROCEDURE — 76060000033 HC ANESTHESIA 1 TO 1.5 HR: Performed by: UROLOGY

## 2019-08-29 DEVICE — URETERAL STENT
Type: IMPLANTABLE DEVICE | Site: URETER | Status: FUNCTIONAL
Brand: POLARIS™ ULTRA

## 2019-08-29 RX ORDER — SODIUM CHLORIDE, SODIUM LACTATE, POTASSIUM CHLORIDE, CALCIUM CHLORIDE 600; 310; 30; 20 MG/100ML; MG/100ML; MG/100ML; MG/100ML
125 INJECTION, SOLUTION INTRAVENOUS CONTINUOUS
Status: DISCONTINUED | OUTPATIENT
Start: 2019-08-29 | End: 2019-08-29 | Stop reason: HOSPADM

## 2019-08-29 RX ORDER — ONDANSETRON 2 MG/ML
4 INJECTION INTRAMUSCULAR; INTRAVENOUS ONCE
Status: DISCONTINUED | OUTPATIENT
Start: 2019-08-29 | End: 2019-08-29 | Stop reason: HOSPADM

## 2019-08-29 RX ORDER — FENTANYL CITRATE 50 UG/ML
50 INJECTION, SOLUTION INTRAMUSCULAR; INTRAVENOUS
Status: DISCONTINUED | OUTPATIENT
Start: 2019-08-29 | End: 2019-08-29 | Stop reason: HOSPADM

## 2019-08-29 RX ORDER — FENTANYL CITRATE 50 UG/ML
INJECTION, SOLUTION INTRAMUSCULAR; INTRAVENOUS AS NEEDED
Status: DISCONTINUED | OUTPATIENT
Start: 2019-08-29 | End: 2019-08-29 | Stop reason: HOSPADM

## 2019-08-29 RX ORDER — INSULIN LISPRO 100 [IU]/ML
INJECTION, SOLUTION INTRAVENOUS; SUBCUTANEOUS ONCE
Status: COMPLETED | OUTPATIENT
Start: 2019-08-29 | End: 2019-08-29

## 2019-08-29 RX ORDER — HYDROMORPHONE HYDROCHLORIDE 2 MG/ML
0.5 INJECTION, SOLUTION INTRAMUSCULAR; INTRAVENOUS; SUBCUTANEOUS
Status: DISCONTINUED | OUTPATIENT
Start: 2019-08-29 | End: 2019-08-29 | Stop reason: HOSPADM

## 2019-08-29 RX ORDER — CEFAZOLIN SODIUM IN 0.9 % NACL 2 G/100 ML
PLASTIC BAG, INJECTION (ML) INTRAVENOUS AS NEEDED
Status: DISCONTINUED | OUTPATIENT
Start: 2019-08-29 | End: 2019-08-29 | Stop reason: HOSPADM

## 2019-08-29 RX ORDER — SODIUM CHLORIDE, SODIUM LACTATE, POTASSIUM CHLORIDE, CALCIUM CHLORIDE 600; 310; 30; 20 MG/100ML; MG/100ML; MG/100ML; MG/100ML
100 INJECTION, SOLUTION INTRAVENOUS CONTINUOUS
Status: DISCONTINUED | OUTPATIENT
Start: 2019-08-29 | End: 2019-08-29 | Stop reason: HOSPADM

## 2019-08-29 RX ORDER — LIDOCAINE HYDROCHLORIDE 20 MG/ML
INJECTION, SOLUTION EPIDURAL; INFILTRATION; INTRACAUDAL; PERINEURAL AS NEEDED
Status: DISCONTINUED | OUTPATIENT
Start: 2019-08-29 | End: 2019-08-29 | Stop reason: HOSPADM

## 2019-08-29 RX ORDER — PROPOFOL 10 MG/ML
INJECTION, EMULSION INTRAVENOUS AS NEEDED
Status: DISCONTINUED | OUTPATIENT
Start: 2019-08-29 | End: 2019-08-29 | Stop reason: HOSPADM

## 2019-08-29 RX ORDER — METOPROLOL TARTRATE 25 MG/1
25 TABLET, FILM COATED ORAL ONCE
Status: COMPLETED | OUTPATIENT
Start: 2019-08-29 | End: 2019-08-29

## 2019-08-29 RX ORDER — OXYCODONE AND ACETAMINOPHEN 5; 325 MG/1; MG/1
1 TABLET ORAL
Status: DISCONTINUED | OUTPATIENT
Start: 2019-08-29 | End: 2019-08-29 | Stop reason: HOSPADM

## 2019-08-29 RX ORDER — FLUMAZENIL 0.1 MG/ML
0.2 INJECTION INTRAVENOUS
Status: DISCONTINUED | OUTPATIENT
Start: 2019-08-29 | End: 2019-08-29 | Stop reason: HOSPADM

## 2019-08-29 RX ORDER — NALOXONE HYDROCHLORIDE 0.4 MG/ML
0.4 INJECTION, SOLUTION INTRAMUSCULAR; INTRAVENOUS; SUBCUTANEOUS AS NEEDED
Status: DISCONTINUED | OUTPATIENT
Start: 2019-08-29 | End: 2019-08-29 | Stop reason: HOSPADM

## 2019-08-29 RX ORDER — ONDANSETRON 2 MG/ML
INJECTION INTRAMUSCULAR; INTRAVENOUS AS NEEDED
Status: DISCONTINUED | OUTPATIENT
Start: 2019-08-29 | End: 2019-08-29 | Stop reason: HOSPADM

## 2019-08-29 RX ADMIN — Medication 2 G: at 14:14

## 2019-08-29 RX ADMIN — PROPOFOL 150 MG: 10 INJECTION, EMULSION INTRAVENOUS at 14:03

## 2019-08-29 RX ADMIN — SODIUM CHLORIDE, SODIUM LACTATE, POTASSIUM CHLORIDE, AND CALCIUM CHLORIDE 125 ML/HR: 600; 310; 30; 20 INJECTION, SOLUTION INTRAVENOUS at 10:43

## 2019-08-29 RX ADMIN — FENTANYL CITRATE 50 MCG: 50 INJECTION, SOLUTION INTRAMUSCULAR; INTRAVENOUS at 14:03

## 2019-08-29 RX ADMIN — LIDOCAINE HYDROCHLORIDE 70 MG: 20 INJECTION, SOLUTION EPIDURAL; INFILTRATION; INTRACAUDAL; PERINEURAL at 14:03

## 2019-08-29 RX ADMIN — ONDANSETRON 4 MG: 2 INJECTION INTRAMUSCULAR; INTRAVENOUS at 14:28

## 2019-08-29 RX ADMIN — SODIUM CHLORIDE, SODIUM LACTATE, POTASSIUM CHLORIDE, AND CALCIUM CHLORIDE 125 ML/HR: 600; 310; 30; 20 INJECTION, SOLUTION INTRAVENOUS at 15:30

## 2019-08-29 RX ADMIN — METOPROLOL TARTRATE 25 MG: 25 TABLET ORAL at 11:13

## 2019-08-29 RX ADMIN — FENTANYL CITRATE 50 MCG: 50 INJECTION, SOLUTION INTRAMUSCULAR; INTRAVENOUS at 14:22

## 2019-08-29 RX ADMIN — INSULIN LISPRO 2 UNITS: 100 INJECTION, SOLUTION INTRAVENOUS; SUBCUTANEOUS at 11:01

## 2019-08-29 NOTE — INTERVAL H&P NOTE
H&P Update:  Hanna Giron was seen and examined. History and physical has been reviewed. The patient has been examined.  There have been no significant clinical changes since the completion of the originally dated History and Physical.

## 2019-08-29 NOTE — ANESTHESIA POSTPROCEDURE EVALUATION
Post-Anesthesia Evaluation and Assessment    Cardiovascular Function/Vital Signs  Visit Vitals  /66   Pulse 87   Temp 36.7 °C (98.1 °F)   Resp 17   Ht 5' 7\" (1.702 m)   Wt 76.3 kg (168 lb 3 oz)   SpO2 97%   BMI 26.34 kg/m²       Patient is status post Procedure(s):  CYSTOSCOPY, LEFT RETROGRADE PYELOGRAM WITH INTERPRETATION, LEFT URETEROSCOPY, LASER LITHOTRIPSY WITH HOLMIUM, POSSIBLE RENAL PELVIS BIOPSY, STENT PLACEMENT WITH C-ARM **SPEC POP**. Nausea/Vomiting: Controlled. Postoperative hydration reviewed and adequate. Pain:  Pain Scale 1: Numeric (0 - 10) (08/29/19 1540)  Pain Intensity 1: 0 (08/29/19 1540)   Managed. Neurological Status:   Neuro (WDL): Within Defined Limits (08/29/19 1540)   At baseline. Mental Status and Level of Consciousness: Baseline and appropriate for discharge. Pulmonary Status:   O2 Device: Room air (08/29/19 1534)   Adequate oxygenation and airway patent. Complications related to anesthesia: None    Post-anesthesia assessment completed. No concerns. Patient has met all discharge requirements.     Signed By: Tiana Guzman MD    August 29, 2019

## 2019-08-29 NOTE — DISCHARGE INSTRUCTIONS
Isai Head. Hubert Kim M.D. [facility]  [address] [facility], [facility]  Office: [phone]  Fax:    [phone]    PROCEDURE: Procedure(s):  CYSTOSCOPY, LEFT RETROGRADE PYELOGRAM WITH INTERPRETATION, LEFT URETEROSCOPY, LASER LITHOTRIPSY WITH HOLMIUM, POSSIBLE RENAL PELVIS BIOPSY, STENT PLACEMENT WITH C-ARM **SPEC POP**    Notify [facility] IMMEDIATELY if any of the following occur:     You are unable to urinate. Urgency to urinate is not uncommon.  You find yourself urinating small frequent amounts associated with severe lower abdominal discomfort.  Bright red blood with clots in the urine. Some reddish urine is not uncommon and should be treated with increasing the amount of fluids you drink.  Temperature above 101.5° and / or chills.  You are nauseous and / or vomiting and you cannot hold down any fluids.  Your pain is not controlled with the pain medication prescribed. Special Considerations:      Do not drive for at least 24 hours after the procedure and until you are no longer taking narcotic pain medication and you are able to move and react without hesitation. MEDICATIONS:  Pain   [x]  Norco®   []  Percocet® []  Dilaudid®    []  Tramadol   Antibiotics   []  Cipro   [x]  Keflex    [] Levaquin   []  Bactrim DS®       Urination   []  Vesicare®   [x]  Flomax     Burning   []  Pyridium®   []  UribelTM     Nausea   []  Zofran®   []  Phenergan®     Miscellaneous   []           [x] Prescriptions Written on Chart    [] Prescriptions sent Electronically           Our office will call you tomorrow to schedule your first follow-up appointment. Please contact [facility] [address] [facility] at [phone] or go to the nearest Emergency Department / Urgent Care facility for any other medical questions or concerns.       DISCHARGE SUMMARY from Nurse    PATIENT INSTRUCTIONS:    After general anesthesia or intravenous sedation, for 24 hours or while taking prescription Narcotics:  · Limit your activities  · Do not drive and operate hazardous machinery  · Do not make important personal or business decisions  · Do  not drink alcoholic beverages  · If you have not urinated within 8 hours after discharge, please contact your surgeon on call. Report the following to your surgeon:  · Excessive pain, swelling, redness or odor of or around the surgical area  · Temperature over 100.5  · Nausea and vomiting lasting longer than 4 hours or if unable to take medications  · Any signs of decreased circulation or nerve impairment to extremity: change in color, persistent  numbness, tingling, coldness or increase pain  · Any questions    What to do at Home:  Recommended activity: Activity as tolerated and Ambulate in house. If you experience any of the following symptoms as highlighted above, please follow up with Dr. Tyler Leslie. *  Please give a list of your current medications to your Primary Care Provider. *  Please update this list whenever your medications are discontinued, doses are      changed, or new medications (including over-the-counter products) are added. *  Please carry medication information at all times in case of emergency situations. These are general instructions for a healthy lifestyle:    No smoking/ No tobacco products/ Avoid exposure to second hand smoke  Surgeon General's Warning:  Quitting smoking now greatly reduces serious risk to your health. Obesity, smoking, and sedentary lifestyle greatly increases your risk for illness    A healthy diet, regular physical exercise & weight monitoring are important for maintaining a healthy lifestyle    You may be retaining fluid if you have a history of heart failure or if you experience any of the following symptoms:  Weight gain of 3 pounds or more overnight or 5 pounds in a week, increased swelling in our hands or feet or shortness of breath while lying flat in bed.   Please call your doctor as soon as you notice any of these symptoms; do not wait until your next office visit. The discharge information has been reviewed with the patient and caregiver. The patient and caregiver verbalized understanding. Discharge medications reviewed with the patient and caregiver and appropriate educational materials and side effects teaching were provided. Patient armband removed and shredded.     ___________________________________________________________________________________________________________________________________

## 2019-08-29 NOTE — BRIEF OP NOTE
BRIEF OPERATIVE NOTE    Date of Procedure: 8/29/2019   Preoperative Diagnosis: GROSS HEMATURIA, KIDNEY STONES  Postoperative Diagnosis: GROSS HEMATURIA, KIDNEY STONES    Procedure(s):  CYSTOSCOPY, LEFT RETROGRADE PYELOGRAM WITH INTERPRETATION, LEFT URETEROSCOPY, LASER LITHOTRIPSY WITH HOLMIUM, POSSIBLE RENAL PELVIS BIOPSY, STENT PLACEMENT WITH C-ARM **SPEC POP**  Surgeon(s) and Role:     Car Trammell MD - Primary         Surgical Assistant: [address] [id number] [facility],[address]    Surgical Staff:  Circ-1: Kari Santana RN; Alisa De Santiago RN; [address]: Sandra Rodriguez  Radiology Technician: Madhavi Hdez  Scrub Tech-1: Salas Green  Scrub Tech-2: Eulalio Pereyra  Surg Asst-1: Vale Rosa  Event Time In Time Out   Incision Start 1412    Incision Close 1505      Anesthesia: General   Estimated Blood Loss: minimal  Specimens:   ID Type Source Tests Collected by Time Destination   1 : Left Kidney Stones Fresh Kidney  Kandis Rosas MD 8/29/2019 1435 Pathology      Findings: several hard renal stones, no renal pelvis lesions   Complications: none  Implants:   Implant Name Type Inv.  Item Serial No.  Lot No. LRB No. Used Action   Asa Rui - [id number]  Harry Dayton DBL-PGTL [id number] -- [facility] [id number] Left 1 Implanted

## 2019-08-29 NOTE — PERIOP NOTES
Verbal report given to [facility] for Phase II. Prescriptions faxed to pharmacy.     Visit Vitals  /70   Pulse 84   Temp 98.1 °F (36.7 °C)   Resp 18   Ht 5' 7\" (1.702 m)   Wt 76.3 kg (168 lb 3 oz)   SpO2 96%   BMI 26.34 kg/m²       Intake/Output Summary (Last 24 hours) at 8/29/2019 1608  Last data filed at 8/29/2019 1600  Gross per 24 hour   Intake 900 ml   Output 100 ml   Net 800 ml       Ronda Ndiaye RN

## 2019-08-29 NOTE — ANESTHESIA PREPROCEDURE EVALUATION
Relevant Problems   No relevant active problems       Anesthetic History   No history of anesthetic complications            Review of Systems / Medical History  Patient summary reviewed, nursing notes reviewed and pertinent labs reviewed    Pulmonary  Within defined limits                 Neuro/Psych   Within defined limits           Cardiovascular  Within defined limits                Exercise tolerance: >4 METS  Comments: Recent negative stress test   GI/Hepatic/Renal         Renal disease: stones    Pertinent negatives: No GERD, PUD, hepatitis and liver disease   Endo/Other    Diabetes      Pertinent negatives: No hypothyroidism, hyperthyroidism and blood dyscrasia   Other Findings              Physical Exam    Airway  Mallampati: III  TM Distance: 4 - 6 cm  Neck ROM: normal range of motion   Mouth opening: Normal     Cardiovascular  Regular rate and rhythm,  S1 and S2 normal,  no murmur, click, rub, or gallop             Dental  No notable dental hx       Pulmonary  Breath sounds clear to auscultation               Abdominal  GI exam deferred       Other Findings            Anesthetic Plan    ASA: 2  Anesthesia type: general          Induction: Intravenous  Anesthetic plan and risks discussed with: Patient and Spouse      GA/LMA

## 2019-08-29 NOTE — OP NOTES
OPERATIVE NOTE     Patient: Zayda Duncan MRN: [id number]  SSN: [id number]    YOB: 1940  Age: 70 y.o. Sex: male      Date of Procedure:  8/29/2019   Preoperative Diagnosis:  GROSS HEMATURIA, KIDNEY STONES  Postoperative Diagnosis:  GROSS HEMATURIA, KIDNEY STONES    Procedure:  Procedure(s):  CYSTOSCOPY, LEFT RETROGRADE PYELOGRAM WITH INTERPRETATION, LEFT URETEROSCOPY, LASER LITHOTRIPSY WITH HOLMIUM, POSSIBLE RENAL PELVIS BIOPSY, STENT PLACEMENT WITH C-ARM **SPEC POP**  Surgeon:  Surgeon(s) and Role:     Monica Brooke MD - Primary  Anesthesia:  General   Assist: MARIA LUZ Seals Clear  Findings:  Multiple hard stones in colleting system, no abnormalities in renal pelvis seen. Procedure Details: The patient was seen in the pre-operative area. The risks, benefits, complications, alternative treatment options, and expected outcomes were again discussed with the patient. The possibilities of reaction to medication, pain, infection, bleeding, major cardiovascular event, death, damage to surrounding structures were specifically addressed. Informed consent was then obtained. The site of surgery properly noted/marked. Patient brought in the operating room and placed   in supine position. After administration of general anesthesia, he was   placed in lithotomy position. Groin and genitalia prepped and draped in the   usual sterile fashion. I began with a 21-Kazakh cystoscope. Cystourethroscopy was performed. I identified the LEFT ureteral orifice. I was able to intubate the ureteral orifice with an open-ended   catheter and retrograde pyelogram was performed. There was no hydronephrosis or hydroureter identified. I then used a 0.035 sensor wire, intubated the open-ended   catheter, placed this into the collecting system on the LEFT side. I then used a 10-cm ureteral balloon dilator and dilated the lower   ureter.  Then using a dual-lumen catheter, a second wire was placed into the   collecting system and an 11 x 13 Navigator sheath was placed over one of   the wires. The sheath and the wire were removed. So at this point, we had a   wire in place in the collecting system and the Navigator sheath. I then   used a flexible ureteroscope, traversed the ureter up to the collecting   system on the left side. There were no tumors or other abnormalities seen in collecting sytem other than the ureteral stones. Using holmium laser, I fragmented the stone into   multiple tiny pieces. Using saline, I then flushed the stone out of the   collecting system. Using a 0 tip basket, I   removed the some of the residual stone fragments,which will be sent to pathology for analysis. At this point, on   fluoroscopy, there was no evidence of any residual stone. The wire remained   in place. I removed the ureteroscope and the Navigator sheath under direct   vision and using cystoscopic guidance, a 5 x 26 double-J stent was placed   over the wire into the collecting system. I removed the   wire. There was a good coil in the kidney and a good coil in the bladder. The bladder was emptied. The patient tolerated the procedure well. The patient was   transferred to recovery room in stable condition. Estimated Blood Loss:  Minimal  Specimens:    ID Type Source Tests Collected by Time Destination   1 : Left Kidney Stones Fresh Kidney  Oneal Dozier MD 8/29/2019 9698 Pathology      Implants:    Implant Name Type Inv.  Item Serial No.  Lot No. LRB No. Used Action   Mitcheal Pong - [id number]  Alicia Leon DBL-PGTL [id number] -- [id number] Left 1 Implanted       Complications:  None    Camryn Barrera MD  8/29/2019  3:28 PM

## 2019-09-09 LAB
CA PHOS MFR STONE: 3 %
CALCIUM OXALATE DIHYDRATE MFR STONE IR: 5 %
COLOR STONE: NORMAL
COM MFR STONE: 92 %
COMMENT, 519994: NORMAL
COMPOSITION, 120440: NORMAL
DISCLAIMER, STO32L: NORMAL
NIDUS STONE QL: NORMAL
SIZE STONE: NORMAL MM
SURFACE CRYSTALS, 120439: NORMAL
WT STONE: 85 MG

## 2020-01-18 ENCOUNTER — HOSPITAL ENCOUNTER (EMERGENCY)
Age: 80
Discharge: HOME | End: 2020-01-18
Attending: EMERGENCY MEDICINE
Payer: MEDICARE

## 2020-01-18 ENCOUNTER — APPOINTMENT (OUTPATIENT)
Dept: GENERAL RADIOLOGY | Age: 80
End: 2020-01-18
Attending: PHYSICIAN ASSISTANT
Payer: MEDICARE

## 2020-01-18 VITALS
DIASTOLIC BLOOD PRESSURE: 89 MMHG | TEMPERATURE: 98.3 F | RESPIRATION RATE: 16 BRPM | WEIGHT: 170 LBS | OXYGEN SATURATION: 98 % | SYSTOLIC BLOOD PRESSURE: 153 MMHG | BODY MASS INDEX: 26.68 KG/M2 | HEIGHT: 67 IN | HEART RATE: 89 BPM

## 2020-01-18 DIAGNOSIS — J20.9 ACUTE BRONCHITIS WITH BRONCHOSPASM: Primary | ICD-10-CM

## 2020-01-18 PROCEDURE — 71046 X-RAY EXAM CHEST 2 VIEWS: CPT

## 2020-01-18 PROCEDURE — 99282 EMERGENCY DEPT VISIT SF MDM: CPT

## 2020-01-18 RX ORDER — ALBUTEROL SULFATE 90 UG/1
2 AEROSOL, METERED RESPIRATORY (INHALATION)
Qty: 1 INHALER | Refills: 1 | Status: SHIPPED | OUTPATIENT
Start: 2020-01-18

## 2020-01-18 RX ORDER — PREDNISONE 20 MG/1
40 TABLET ORAL DAILY
Qty: 10 TAB | Refills: 0 | Status: SHIPPED | OUTPATIENT
Start: 2020-01-18 | End: 2020-01-23

## 2020-01-18 RX ORDER — DOXYCYCLINE HYCLATE 100 MG
100 TABLET ORAL 2 TIMES DAILY
Qty: 14 TAB | Refills: 0 | Status: SHIPPED | OUTPATIENT
Start: 2020-01-18 | End: 2020-01-25

## 2020-01-18 RX ORDER — CODEINE PHOSPHATE AND GUAIFENESIN 10; 100 MG/5ML; MG/5ML
5 SOLUTION ORAL
Qty: 160 ML | Refills: 0 | Status: SHIPPED | OUTPATIENT
Start: 2020-01-18 | End: 2020-01-23

## 2020-01-18 NOTE — ED PROVIDER NOTES
EMERGENCY DEPARTMENT HISTORY AND PHYSICAL EXAM    Date: 1/18/2020  Patient Name: Eliana No    History of Presenting Illness     Chief Complaint   Patient presents with    Chest Congestion         History Provided By: Patient    Chief Complaint: cough    HPI(Context):   8:21 AM  Eliana No is a 70 y.o. male with hx of DMII who presents to the emergency department C/O cough. Associated sxs include chest and nasal congestion. Sxs x 2 weeks. No relief with OTC meds. Non-productive cough. Pt is nonsmoker. No hx of resp problems. Pt denies fever, chills, chest pain, SOB, wheezing, LE swelling, and any other sxs or complaints. PCP: Enrique, MD Iesha    Current Outpatient Medications   Medication Sig Dispense Refill    predniSONE (DELTASONE) 20 mg tablet Take 40 mg by mouth daily for 5 days. Take with food. Check blood sugar while taking this medication. 10 Tab 0    doxycycline (VIBRA-TABS) 100 mg tablet Take 1 Tab by mouth two (2) times a day for 7 days. 14 Tab 0    guaiFENesin-codeine (ROBITUSSIN AC) 100-10 mg/5 mL solution Take 5 mL by mouth three (3) times daily as needed for Cough for up to 5 days. Max Daily Amount: 15 mL. 160 mL 0    albuterol (PROVENTIL HFA, VENTOLIN HFA, PROAIR HFA) 90 mcg/actuation inhaler Take 2 Puffs by inhalation every four (4) hours as needed for Wheezing or Shortness of Breath. 1 Inhaler 1    inhalational spacing device 1 Each by Does Not Apply route as needed (to be used with albuterol HFA.). Please dispense one adult spacer 1 Device 0    metoprolol tartrate (LOPRESSOR) 25 mg tablet Take 25 mg by mouth two (2) times a day.  cholecalciferol, vitamin D3, (VITAMIN D3) 2,000 unit tab Take 5,000 Units by mouth daily.  magnesium oxide 500 mg tab Take 500 mg by mouth nightly.  cyanocobalamin 1,000 mcg tablet Take 1,000 mcg by mouth daily.  metFORMIN (GLUCOPHAGE) 1,000 mg tablet Take 1,000 mg by mouth two (2) times daily (with meals).       lisinopril (PRINIVIL, ZESTRIL) 5 mg tablet Take 5 mg by mouth daily.  glipiZIDE SR (GLUCOTROL XL) 5 mg CR tablet Take 10 mg by mouth daily.  co-enzyme Q-10 (CO Q-10) 100 mg capsule Take 100 mg by mouth daily. Past History     Past Medical History:  Past Medical History:   Diagnosis Date    Aneurysm ([facility] [address]     \" not sure where\"    Back problem     Carotid artery aneurysm ([facility] [address] 09/17/2014    Diabetes ([facility] [address] 2007    oral med    Kidney cysts     Pneumonia     Renal calculus 2014    Rib contusion     Sciatica        Past Surgical History:  Past Surgical History:   Procedure Laterality Date    HX CATARACT REMOVAL Bilateral     HX HERNIA REPAIR      HX LITHOTRIPSY  08/07/2014; 2018    x2    HX ORTHOPAEDIC      left ankle ligament       Family History:  History reviewed. No pertinent family history. Social History:  Social History     Tobacco Use    Smoking status: Never Smoker    Smokeless tobacco: Never Used   Substance Use Topics    Alcohol use: No    Drug use: No       Allergies:  No Known Allergies      Review of Systems   Review of Systems   Constitutional: Negative for chills and fever. HENT: Positive for congestion. Negative for sore throat. Respiratory: Positive for cough. Negative for shortness of breath and wheezing. Cardiovascular: Negative for chest pain and palpitations. Allergic/Immunologic: Negative for immunocompromised state. All other systems reviewed and are negative. Physical Exam     Vitals:    01/18/20 0819   BP: 153/89   Pulse: 89   Resp: 16   Temp: 98.3 °F (36.8 °C)   SpO2: 98%   Weight: 77.1 kg (170 lb)   Height: 5' 7\" (1.702 m)     Physical Exam  Vitals signs and nursing note reviewed. Constitutional:       General: He is not in acute distress. Appearance: He is well-developed. He is not diaphoretic. Comments:  geriatric male in NAD. Alert. Appears comfortable. HENT:      Head: Normocephalic and atraumatic.       Right Ear: External ear normal. No drainage or swelling. Tympanic membrane is not perforated, erythematous or bulging. Left Ear: External ear normal. No drainage or swelling. Tympanic membrane is not perforated, erythematous or bulging. Nose: Mucosal edema present. No rhinorrhea. Right Sinus: No maxillary sinus tenderness or frontal sinus tenderness. Left Sinus: No maxillary sinus tenderness or frontal sinus tenderness. Mouth/Throat:      Mouth: No oral lesions. Dentition: No dental abscesses. Pharynx: Uvula midline. No oropharyngeal exudate, posterior oropharyngeal erythema or uvula swelling. Tonsils: No tonsillar abscesses. Eyes:      General: No scleral icterus. Right eye: No discharge. Left eye: No discharge. Conjunctiva/sclera: Conjunctivae normal.   Neck:      Musculoskeletal: Normal range of motion. Cardiovascular:      Rate and Rhythm: Normal rate and regular rhythm. Heart sounds: Normal heart sounds. No murmur. No friction rub. No gallop. Pulmonary:      Effort: Pulmonary effort is normal. No tachypnea, accessory muscle usage or respiratory distress. Breath sounds: Normal breath sounds. No decreased breath sounds, wheezing, rhonchi or rales. Musculoskeletal: Normal range of motion. Skin:     General: Skin is warm and dry. Neurological:      Mental Status: He is alert and oriented to person, place, and time. Psychiatric:         Judgment: Judgment normal.             Diagnostic Study Results     Labs -   No results found for this or any previous visit (from the past 12 hour(s)). XR CHEST PA LAT   Final Result   IMPRESSION:      No active cardiopulmonary disease. CT Results  (Last 48 hours)    None        CXR Results  (Last 48 hours)               01/18/20 0845  XR CHEST PA LAT Final result    Impression:  IMPRESSION:       No active cardiopulmonary disease.            Narrative:  EXAM: CHEST RADIOGRAPH       CLINICAL INDICATION/HISTORY: Cough   -Additional: None       COMPARISON: July 25, 2014       TECHNIQUE: Portable frontal view of the chest       _______________       FINDINGS:       SUPPORT DEVICES: None. HEART AND MEDIASTINUM: No appreciable cardiomegaly. Remaining mediastinal   contours within normal limits. LUNGS AND PLEURAL SPACES: Chronic right lung volume loss and apical scarring. Right pleural thickening is also unchanged. No consolidation or mass. BONY THORAX AND SOFT TISSUES: Unremarkable.       _______________                 Medications given in the ED-  Medications - No data to display      Medical Decision Making   I am the first provider for this patient. I reviewed the vital signs, available nursing notes, past medical history, past surgical history, family history and social history. Vital Signs-Reviewed the patient's vital signs. Pulse Oximetry Analysis - 98% on RA     Records Reviewed: Nursing Notes    Provider Notes (Medical Decision Making): URI, sinusitis, bronchitis, influenza, PNA, asthma/RAD, allergic, COPD. Doubt PE    Procedures:  Procedures    ED Course:   8:21 AM Initial assessment performed. The patients presenting problems have been discussed, and they are in agreement with the care plan formulated and outlined with them. I have encouraged them to ask questions as they arise throughout their visit. Diagnosis and Disposition       Afebrile. Lungs CTAB. CXR clear. No resp distress. VSS. Given duration of sxs will tx with ABX and prednisone. Check BS checks and take glucotrol. PCP FU. Reasons to RTED discussed with pt. All questions answered. Pt feels comfortable going home at this time. Pt expressed understanding and he agrees with plan. 1. Acute bronchitis with bronchospasm        PLAN:  1. D/C Home  2.    Discharge Medication List as of 1/18/2020  9:38 AM      START taking these medications    Details   predniSONE (DELTASONE) 20 mg tablet Take 40 mg by mouth daily for 5 days. Take with food. Check blood sugar while taking this medication. , Print, Disp-10 Tab, R-0      doxycycline (VIBRA-TABS) 100 mg tablet Take 1 Tab by mouth two (2) times a day for 7 days. , Print, Disp-14 Tab, R-0      guaiFENesin-codeine (ROBITUSSIN AC) 100-10 mg/5 mL solution Take 5 mL by mouth three (3) times daily as needed for Cough for up to 5 days. Max Daily Amount: 15 mL. , Print, Disp-160 mL, R-0      albuterol (PROVENTIL HFA, VENTOLIN HFA, PROAIR HFA) 90 mcg/actuation inhaler Take 2 Puffs by inhalation every four (4) hours as needed for Wheezing or Shortness of Breath., Print, Disp-1 Inhaler, R-1      inhalational spacing device 1 Each by Does Not Apply route as needed (to be used with albuterol HFA.). Please dispense one adult spacer, Print, Disp-1 Device, R-0         CONTINUE these medications which have NOT CHANGED    Details   metoprolol tartrate (LOPRESSOR) 25 mg tablet Take 25 mg by mouth two (2) times a day., Historical Med      cholecalciferol, vitamin D3, (VITAMIN D3) 2,000 unit tab Take 5,000 Units by mouth daily. , Historical Med      magnesium oxide 500 mg tab Take 500 mg by mouth nightly., Historical Med      cyanocobalamin 1,000 mcg tablet Take 1,000 mcg by mouth daily. , Historical Med      metFORMIN (GLUCOPHAGE) 1,000 mg tablet Take 1,000 mg by mouth two (2) times daily (with meals). , Historical Med      lisinopril (PRINIVIL, ZESTRIL) 5 mg tablet Take 5 mg by mouth daily. , Historical Med      glipiZIDE SR (GLUCOTROL XL) 5 mg CR tablet Take 10 mg by mouth daily. , Historical Med      co-enzyme Q-10 (CO Q-10) 100 mg capsule Take 100 mg by mouth daily. , Historical Med           3. Follow-up Information     Follow up With Specialties Details [address]    [phone]    [facility] EMERGENCY DEPT Emergency Medicine  As needed, If symptoms worsen [address] [address]  [phone]        _______________________________    Attestations:   This note is prepared by Srikanth Bell PA-C.  _______________________________      Please note that this dictation was completed with [employer], the computer voice recognition software. Quite often unanticipated grammatical, syntax, homophones, and other interpretive errors are inadvertently transcribed by the computer software. Please disregard these errors. Please excuse any errors that have escaped final proofreading.

## 2020-02-14 ENCOUNTER — HOSPITAL ENCOUNTER (EMERGENCY)
Dept: CT IMAGING | Age: 80
Discharge: HOME | End: 2020-02-14
Attending: EMERGENCY MEDICINE
Payer: MEDICARE

## 2020-02-14 ENCOUNTER — APPOINTMENT (OUTPATIENT)
Dept: GENERAL RADIOLOGY | Age: 80
End: 2020-02-14
Attending: EMERGENCY MEDICINE
Payer: MEDICARE

## 2020-02-14 ENCOUNTER — HOSPITAL ENCOUNTER (EMERGENCY)
Age: 80
Discharge: HOME | End: 2020-02-14
Attending: EMERGENCY MEDICINE
Payer: MEDICARE

## 2020-02-14 VITALS
DIASTOLIC BLOOD PRESSURE: 71 MMHG | OXYGEN SATURATION: 99 % | HEART RATE: 77 BPM | BODY MASS INDEX: 25.84 KG/M2 | RESPIRATION RATE: 18 BRPM | TEMPERATURE: 98.2 F | WEIGHT: 165 LBS | SYSTOLIC BLOOD PRESSURE: 128 MMHG

## 2020-02-14 DIAGNOSIS — R73.9 HYPERGLYCEMIA: ICD-10-CM

## 2020-02-14 DIAGNOSIS — R42 DIZZINESS: Primary | ICD-10-CM

## 2020-02-14 LAB
ALBUMIN SERPL-MCNC: 3.7 G/DL (ref 3.4–5)
ALBUMIN/GLOB SERPL: 0.9 {RATIO} (ref 0.8–1.7)
ALP SERPL-CCNC: 66 U/L (ref 45–117)
ALT SERPL-CCNC: 25 U/L (ref 16–61)
ANION GAP SERPL CALC-SCNC: 6 MMOL/L (ref 3–18)
APPEARANCE UR: CLEAR
AST SERPL-CCNC: 20 U/L (ref 10–38)
ATRIAL RATE: 98 BPM
BACTERIA URNS QL MICRO: NEGATIVE /HPF
BASOPHILS # BLD: 0 K/UL (ref 0–0.1)
BASOPHILS NFR BLD: 0 % (ref 0–2)
BILIRUB SERPL-MCNC: 0.6 MG/DL (ref 0.2–1)
BILIRUB UR QL: NEGATIVE
BUN SERPL-MCNC: 16 MG/DL (ref 7–18)
BUN/CREAT SERPL: 11 (ref 12–20)
CALCIUM SERPL-MCNC: 9.8 MG/DL (ref 8.5–10.1)
CALCULATED P AXIS, ECG09: 61 DEGREES
CALCULATED R AXIS, ECG10: 33 DEGREES
CALCULATED T AXIS, ECG11: 62 DEGREES
CHLORIDE SERPL-SCNC: 107 MMOL/L (ref 100–111)
CK MB CFR SERPL CALC: NORMAL % (ref 0–4)
CK MB SERPL-MCNC: <1 NG/ML (ref 5–25)
CK SERPL-CCNC: 47 U/L (ref 39–308)
CO2 SERPL-SCNC: 29 MMOL/L (ref 21–32)
COLOR UR: YELLOW
CREAT SERPL-MCNC: 1.46 MG/DL (ref 0.6–1.3)
DIAGNOSIS, 93000: NORMAL
DIFFERENTIAL METHOD BLD: ABNORMAL
EOSINOPHIL # BLD: 0.3 K/UL (ref 0–0.4)
EOSINOPHIL NFR BLD: 4 % (ref 0–5)
EPITH CASTS URNS QL MICRO: NEGATIVE /LPF (ref 0–5)
ERYTHROCYTE [DISTWIDTH] IN BLOOD BY AUTOMATED COUNT: 12.6 % (ref 11.6–14.5)
GLOBULIN SER CALC-MCNC: 4 G/DL (ref 2–4)
GLUCOSE SERPL-MCNC: 226 MG/DL (ref 74–99)
GLUCOSE UR STRIP.AUTO-MCNC: >1000 MG/DL
HCT VFR BLD AUTO: 51.1 % (ref 36–48)
HGB BLD-MCNC: 17.3 G/DL (ref 13–16)
HGB UR QL STRIP: NEGATIVE
KETONES UR QL STRIP.AUTO: NEGATIVE MG/DL
LEUKOCYTE ESTERASE UR QL STRIP.AUTO: NEGATIVE
LYMPHOCYTES # BLD: 1.7 K/UL (ref 0.9–3.6)
LYMPHOCYTES NFR BLD: 19 % (ref 21–52)
MCH RBC QN AUTO: 33.7 PG (ref 24–34)
MCHC RBC AUTO-ENTMCNC: 33.9 G/DL (ref 31–37)
MCV RBC AUTO: 99.6 FL (ref 74–97)
MONOCYTES # BLD: 1 K/UL (ref 0.05–1.2)
MONOCYTES NFR BLD: 11 % (ref 3–10)
NEUTS SEG # BLD: 5.7 K/UL (ref 1.8–8)
NEUTS SEG NFR BLD: 66 % (ref 40–73)
NITRITE UR QL STRIP.AUTO: NEGATIVE
P-R INTERVAL, ECG05: 190 MS
PH UR STRIP: 6 [PH] (ref 5–8)
PLATELET # BLD AUTO: 264 K/UL (ref 135–420)
PMV BLD AUTO: 10.3 FL (ref 9.2–11.8)
POTASSIUM SERPL-SCNC: 4.2 MMOL/L (ref 3.5–5.5)
PROT SERPL-MCNC: 7.7 G/DL (ref 6.4–8.2)
PROT UR STRIP-MCNC: 30 MG/DL
Q-T INTERVAL, ECG07: 326 MS
QRS DURATION, ECG06: 78 MS
QTC CALCULATION (BEZET), ECG08: 416 MS
RBC # BLD AUTO: 5.13 M/UL (ref 4.7–5.5)
RBC #/AREA URNS HPF: NORMAL /HPF (ref 0–5)
SODIUM SERPL-SCNC: 142 MMOL/L (ref 136–145)
SP GR UR REFRACTOMETRY: 1.02 (ref 1–1.03)
TROPONIN I SERPL-MCNC: <0.02 NG/ML (ref 0–0.04)
UROBILINOGEN UR QL STRIP.AUTO: 0.2 EU/DL (ref 0.2–1)
VENTRICULAR RATE, ECG03: 98 BPM
WBC # BLD AUTO: 8.6 K/UL (ref 4.6–13.2)
WBC URNS QL MICRO: NORMAL /HPF (ref 0–5)

## 2020-02-14 PROCEDURE — 82550 ASSAY OF CK (CPK): CPT

## 2020-02-14 PROCEDURE — 70450 CT HEAD/BRAIN W/O DYE: CPT

## 2020-02-14 PROCEDURE — 80053 COMPREHEN METABOLIC PANEL: CPT

## 2020-02-14 PROCEDURE — 71045 X-RAY EXAM CHEST 1 VIEW: CPT

## 2020-02-14 PROCEDURE — 85025 COMPLETE CBC W/AUTO DIFF WBC: CPT

## 2020-02-14 PROCEDURE — 81001 URINALYSIS AUTO W/SCOPE: CPT

## 2020-02-14 PROCEDURE — 99285 EMERGENCY DEPT VISIT HI MDM: CPT

## 2020-02-14 PROCEDURE — 93005 ELECTROCARDIOGRAM TRACING: CPT

## 2020-02-14 RX ORDER — MECLIZINE HYDROCHLORIDE 25 MG/1
25 TABLET ORAL
Qty: 30 TAB | Refills: 0 | Status: SHIPPED | OUTPATIENT
Start: 2020-02-14 | End: 2020-02-24

## 2020-02-14 NOTE — ED PROVIDER NOTES
EMERGENCY DEPARTMENT HISTORY AND PHYSICAL EXAM    Date: 2/14/2020  Patient Name: Sai Lion    History of Presenting Illness     Chief Complaint   Patient presents with    Dizziness         History Provided By: Patient    Sai Lion is a 70 y.o. male who presents to the emergency department C/O dizziness. States is been going on for the last 3 days mostly in the morning. He states when he experiences the dizziness it feels like a sensation of room spinning with some difficulty maintaining his balance. He denies any headache, chest pain, shortness of breath, hearing loss, fevers, chills. States he is never had this problem before. States he stopped taking his CBD oil a few days ago prior to these symptoms starting. PCP: Other, MD Iesha    Current Outpatient Medications   Medication Sig Dispense Refill    meclizine (ANTIVERT) 25 mg tablet Take 1 Tab by mouth three (3) times daily as needed for Dizziness for up to 10 days. 30 Tab 0    albuterol (PROVENTIL HFA, VENTOLIN HFA, PROAIR HFA) 90 mcg/actuation inhaler Take 2 Puffs by inhalation every four (4) hours as needed for Wheezing or Shortness of Breath. 1 Inhaler 1    inhalational spacing device 1 Each by Does Not Apply route as needed (to be used with albuterol HFA.). Please dispense one adult spacer 1 Device 0    metoprolol tartrate (LOPRESSOR) 25 mg tablet Take 25 mg by mouth two (2) times a day.  cholecalciferol, vitamin D3, (VITAMIN D3) 2,000 unit tab Take 5,000 Units by mouth daily.  magnesium oxide 500 mg tab Take 500 mg by mouth nightly.  cyanocobalamin 1,000 mcg tablet Take 1,000 mcg by mouth daily.  metFORMIN (GLUCOPHAGE) 1,000 mg tablet Take 1,000 mg by mouth two (2) times daily (with meals).  lisinopril (PRINIVIL, ZESTRIL) 5 mg tablet Take 5 mg by mouth daily.  glipiZIDE SR (GLUCOTROL XL) 5 mg CR tablet Take 10 mg by mouth daily.  co-enzyme Q-10 (CO Q-10) 100 mg capsule Take 100 mg by mouth daily. Past History     Past Medical History:  Past Medical History:   Diagnosis Date    Aneur[facility] [address]     \" not sure where\"    Back problem     Carotid artery aneurysm ([facility] [address] 09/17/2014    [facility]    oral med    Kidney cysts     Pneumonia     Renal calculus 2014    Rib contusion     Sciatica        Past Surgical History:  Past Surgical History:   Procedure Laterality Date    HX CATARACT REMOVAL Bilateral     HX HERNIA REPAIR      HX LITHOTRIPSY  08/07/2014; 2018    x2    HX ORTHOPAEDIC      left ankle ligament       Family History:  History reviewed. No pertinent family history. Social History:  Social History     Tobacco Use    Smoking status: Never Smoker    Smokeless tobacco: Never Used   Substance Use Topics    Alcohol use: No    Drug use: No       Allergies:  No Known Allergies      Review of Systems   Review of Systems   Neurological: Positive for dizziness. All other systems reviewed and are negative.         Physical Exam     Vitals:    02/14/20 0824   BP: 139/79   Pulse: 85   Resp: 18   Temp: 98.2 °F (36.8 °C)   SpO2: 100%   Weight: 74.8 kg (165 lb)     Physical Exam    Nursing notes and vital signs reviewed    Airway: intact, speaking normally  Breathing: No apparent distress, no cyanosis  Circulation: Peripheral pulses equal    Constitutional: Non toxic appearing, no acute distress  HEENT & Neck: Normocephalic, Atraumatic, PERRL, EOMI, No rhinorrhea, external nose normal, external ears normal, mucous membranes moist, No stridor, No JVD  Cardiovascular: Regular rate and rhythm, no murmurs  Chest: Normal work of breathing and chest excursion bilaterally  Lungs: Clear to ausculation bilaterally  Abdomen: Soft, non tender, non distended, normoactive bowel sounds, No rigidity, no peritoneal signs  Musculoskeletal: No evidence of trauma or deformity to the back or neck  Extremities: No evidence of trauma or deformity, no LE edema  Skin: Warm, No obvious rashes  Neuro: Horizontal nystagmus is noted unidirectional, vertical test of skew is negative, head impulse test does show minimal eye lagging. Overall hints exam is negative. Alert and oriented x 3, CN 2-12 intact, normal speech, strength and sensation full and symmetric bilaterally, normal coordination  Psychiatric: Normal mood and affect      Diagnostic Study Results     Labs -     Recent Results (from the past 72 hour(s))   EKG, 12 LEAD, INITIAL    Collection Time: 02/14/20  8:40 AM   Result Value Ref Range    Ventricular Rate 98 BPM    Atrial Rate 98 BPM    P-R Interval 190 ms    QRS Duration 78 ms    Q-T Interval 326 ms    QTC Calculation (Bezet) 416 ms    Calculated P Axis 61 degrees    Calculated R Axis 33 degrees    Calculated T Axis 62 degrees    Diagnosis       Normal sinus rhythm  Nonspecific T wave abnormality  Abnormal ECG  When compared with ECG of 14-AUG-2019 16:08,  No significant change was found     CBC WITH AUTOMATED DIFF    Collection Time: 02/14/20  8:45 AM   Result Value Ref Range    WBC 8.6 4.6 - 13.2 K/uL    RBC 5.13 4.70 - 5.50 M/uL    HGB 17.3 (H) 13.0 - 16.0 g/dL    HCT 51.1 (H) 36.0 - 48.0 %    MCV 99.6 (H) 74.0 - 97.0 FL    MCH 33.7 24.0 - 34.0 PG    MCHC 33.9 31.0 - 37.0 g/dL    RDW 12.6 11.6 - 14.5 %    PLATELET 231 305 - 773 K/uL    MPV 10.3 9.2 - 11.8 FL    NEUTROPHILS 66 40 - 73 %    LYMPHOCYTES 19 (L) 21 - 52 %    MONOCYTES 11 (H) 3 - 10 %    EOSINOPHILS 4 0 - 5 %    BASOPHILS 0 0 - 2 %    ABS. NEUTROPHILS 5.7 1.8 - 8.0 K/UL    ABS. LYMPHOCYTES 1.7 0.9 - 3.6 K/UL    ABS. MONOCYTES 1.0 0.05 - 1.2 K/UL    ABS. EOSINOPHILS 0.3 0.0 - 0.4 K/UL    ABS.  BASOPHILS 0.0 0.0 - 0.1 K/UL    DF AUTOMATED     CARDIAC PANEL,(CK, CKMB & TROPONIN)    Collection Time: 02/14/20  8:45 AM   Result Value Ref Range    CK 47 39 - 308 U/L    CK - MB <1.0 <3.6 ng/ml    CK-MB Index  0.0 - 4.0 %     CALCULATION NOT PERFORMED WHEN RESULT IS BELOW LINEAR LIMIT    Troponin-I, QT <0.02 0.0 - 3.503 NG/ML   METABOLIC PANEL, COMPREHENSIVE Collection Time: 02/14/20  8:45 AM   Result Value Ref Range    Sodium 142 136 - 145 mmol/L    Potassium 4.2 3.5 - 5.5 mmol/L    Chloride 107 100 - 111 mmol/L    CO2 29 21 - 32 mmol/L    Anion gap 6 3.0 - 18 mmol/L    Glucose 226 (H) 74 - 99 mg/dL    BUN 16 7.0 - 18 MG/DL    Creatinine 1.46 (H) 0.6 - 1.3 MG/DL    BUN/Creatinine ratio 11 (L) 12 - 20      GFR est AA 57 (L) >60 ml/min/1.73m2    GFR est non-AA 47 (L) >60 ml/min/1.73m2    Calcium 9.8 8.5 - 10.1 MG/DL    Bilirubin, total 0.6 0.2 - 1.0 MG/DL    ALT (SGPT) 25 16 - 61 U/L    AST (SGOT) 20 10 - 38 U/L    Alk. phosphatase 66 45 - 117 U/L    Protein, total 7.7 6.4 - 8.2 g/dL    Albumin 3.7 3.4 - 5.0 g/dL    Globulin 4.0 2.0 - 4.0 g/dL    A-G Ratio 0.9 0.8 - 1.7     URINALYSIS W/ RFLX MICROSCOPIC    Collection Time: 02/14/20 10:00 AM   Result Value Ref Range    Color YELLOW      Appearance CLEAR      Specific gravity 1.017 1.005 - 1.030      pH (UA) 6.0 5.0 - 8.0      Protein 30 (A) NEG mg/dL    Glucose >1,000 (A) NEG mg/dL    Ketone NEGATIVE  NEG mg/dL    Bilirubin NEGATIVE  NEG      Blood NEGATIVE  NEG      Urobilinogen 0.2 0.2 - 1.0 EU/dL    Nitrites NEGATIVE  NEG      Leukocyte Esterase NEGATIVE  NEG     URINE MICROSCOPIC ONLY    Collection Time: 02/14/20 10:00 AM   Result Value Ref Range    WBC NONE 0 - 5 /hpf    RBC NONE 0 - 5 /hpf    Epithelial cells NEGATIVE  0 - 5 /lpf    Bacteria NEGATIVE  NEG /hpf       Radiologic Studies -   CT HEAD WO CONT   Final Result   IMPRESSION:      1. No evidence for acute infarct, hemorrhage, or mass effect. CT can be negative   in acute setting. 2. Mild atrophy demonstrating nonspecific white matter hypodensities, likely   chronic microvascular disease. 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 Nothing acute.         CT Results  (Last 48 hours)               02/14/20 1829  CT HEAD WO CONT Final result    Impression:  IMPRESSION:       1. No evidence for acute infarct, hemorrhage, or mass effect. CT can be negative   in acute setting. 2. Mild atrophy demonstrating nonspecific white matter hypodensities, likely   chronic microvascular disease. Narrative:  CT head without contrast       INDICATION: New onset dizziness for 2 days. COMPARISON: CT 06/20/2017. TECHNIQUE: Axial CT imaging of the head was performed without intravenous   contrast. One or more dose reduction techniques were used on this CT: automated   exposure control, adjustment of the mAs and/or kVp according to patient size,   and iterative reconstruction techniques. The specific techniques used on this   CT exam have been documented in the patient's electronic medical record. Digital   Imaging and Communications in Medicine (DICOM) format image data are available   to nonaffiliated external healthcare facilities or entities on a secure, media   free, reciprocally searchable basis with patient authorization for at least a   12-month period after this study. _______________       FINDINGS:       BRAIN AND POSTERIOR FOSSA: There is mild diffuse peripheral atrophy with   prominence of the sylvian fissures, the cisterns and the sulci pattern without   midline shift or mass effect. There is nonspecific periventricular and   subcortical hypodensities, most probably chronic small vessel ischemia. There   is no evidence of any hemorrhage, acute infarct, or abnormal fluid collection.]       EXTRA-AXIAL SPACES AND MENINGES: There are no abnormal extra-axial fluid   collections. CALVARIUM: Intact. SINUSES: Clear.        OTHER: None.       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 Nothing acute. Narrative:  EXAM: CHEST RADIOGRAPH       CLINICAL INDICATION/HISTORY: dizzines   -Additional: None       COMPARISON: 1/18/2020       TECHNIQUE: Portable frontal view of the chest       _______________       FINDINGS:       SUPPORT DEVICES: None. HEART AND MEDIASTINUM: Normal heart size. The upper mediastinum is pulled to the   right, unchanged. LUNGS AND PLEURAL SPACES: Right hemithoracic volume loss with severe fibrotic   scarring in the right apex, accompanied by pleural thickening, all unchanged. The left side of the chest is clear. BONY THORAX AND SOFT TISSUES: Unremarkable.       _______________                 Medications given in the ED-  Medications - No data to display      Medical Decision Making   I am the first provider for this patient. I reviewed the vital signs, available nursing notes, past medical history, past surgical history, family history and social history. Vital Signs-Reviewed the patient's vital signs. Pulse Oximetry Analysis - 100% on Room air     Cardiac Monitor:  Rate: 85 bpm  Rhythm: sinus    EKG interpretation: (Preliminary)  EKG read by Dr. Swathi Browne 8:49 AM   88 normal sinus rhythm, normal axis, no ST changes    Records Reviewed: Nursing Notes    Procedures:  Procedures    ED Course:   Patient had no reproduction of his dizziness type symptoms in the emergency department. His urinary analysis showing increased amount of glucose in his urine, glucose 226, patient able to tolerate oral intake. He is a diabetic. Remainder of his laboratory findings unremarkable other than some mild hemodilution. CT scan and chest x-ray showed no evidence of acute process. I discussed with the patient that his symptoms appear likely has vertigo. Recommended to take Antivert as directed and follow-up with an ENT doctor otherwise come back to the emergency department if symptoms worsen.   He understands and agrees to plan      Diagnosis and Disposition         DISCHARGE NOTE:    Obdulio Martin  results have been reviewed with him. He has been counseled regarding his diagnosis, treatment, and plan. He verbally conveys understanding and agreement of the signs, symptoms, diagnosis, treatment and prognosis and additionally agrees to follow up as discussed. He also agrees with the care-plan and conveys that all of his questions have been answered. I have also provided discharge instructions for him that include: educational information regarding their diagnosis and treatment, and list of reasons why they would want to return to the ED prior to their follow-up appointment, should his condition change. He has been provided with education for proper emergency department utilization. CLINICAL IMPRESSION:    1. Dizziness    2. Hyperglycemia        PLAN:  1. D/C Home  2. Current Discharge Medication List      START taking these medications    Details   meclizine (ANTIVERT) 25 mg tablet Take 1 Tab by mouth three (3) times daily as needed for Dizziness for up to 10 days. Qty: 30 Tab, Refills: 0           3. Follow-up Information     Follow up With Specialties Details Why Contact Info    Julisa Wells MD Otolaryngology, Surgery Schedule an appointment as soon as possible for a visit  for ENT doctor for vertigo [address]  [phone]          _______________________________      Please note that this dictation was completed with idio, the computer voice recognition software. Quite often unanticipated grammatical, syntax, homophones, and other interpretive errors are inadvertently transcribed by the computer software. Please disregard these errors. Please excuse any errors that have escaped final proofreading.

## 2020-07-22 ENCOUNTER — HOSPITAL ENCOUNTER (EMERGENCY)
Age: 80
Discharge: HOME | End: 2020-07-22
Attending: EMERGENCY MEDICINE
Payer: MEDICARE

## 2020-07-22 ENCOUNTER — APPOINTMENT (OUTPATIENT)
Dept: GENERAL RADIOLOGY | Age: 80
End: 2020-07-22
Attending: PHYSICIAN ASSISTANT
Payer: MEDICARE

## 2020-07-22 VITALS
OXYGEN SATURATION: 99 % | HEIGHT: 67 IN | HEART RATE: 82 BPM | BODY MASS INDEX: 25.84 KG/M2 | TEMPERATURE: 97.7 F | DIASTOLIC BLOOD PRESSURE: 74 MMHG | RESPIRATION RATE: 16 BRPM | SYSTOLIC BLOOD PRESSURE: 137 MMHG

## 2020-07-22 DIAGNOSIS — S61.210A LACERATION OF RIGHT INDEX FINGER WITHOUT FOREIGN BODY WITHOUT DAMAGE TO NAIL, INITIAL ENCOUNTER: ICD-10-CM

## 2020-07-22 DIAGNOSIS — S62.660B OPEN NONDISPLACED FRACTURE OF DISTAL PHALANX OF RIGHT INDEX FINGER, INITIAL ENCOUNTER: Primary | ICD-10-CM

## 2020-07-22 PROCEDURE — 74011000250 HC RX REV CODE- 250: Performed by: PHYSICIAN ASSISTANT

## 2020-07-22 PROCEDURE — 75810000293 HC SIMP/SUPERF WND  RPR

## 2020-07-22 PROCEDURE — 73140 X-RAY EXAM OF FINGER(S): CPT

## 2020-07-22 PROCEDURE — 77030008304 HC SPLNT FNGR ALUM DERY -A

## 2020-07-22 PROCEDURE — 99283 EMERGENCY DEPT VISIT LOW MDM: CPT

## 2020-07-22 PROCEDURE — 74011250637 HC RX REV CODE- 250/637: Performed by: PHYSICIAN ASSISTANT

## 2020-07-22 RX ORDER — CEPHALEXIN 500 MG/1
500 CAPSULE ORAL 4 TIMES DAILY
Qty: 40 CAP | Refills: 0 | Status: SHIPPED | OUTPATIENT
Start: 2020-07-22 | End: 2020-08-01

## 2020-07-22 RX ORDER — BACITRACIN 500 [USP'U]/G
OINTMENT TOPICAL
Status: COMPLETED | OUTPATIENT
Start: 2020-07-22 | End: 2020-07-22

## 2020-07-22 RX ORDER — CEPHALEXIN 250 MG/1
500 CAPSULE ORAL
Status: COMPLETED | OUTPATIENT
Start: 2020-07-22 | End: 2020-07-22

## 2020-07-22 RX ADMIN — BACITRACIN: 500 OINTMENT TOPICAL at 12:01

## 2020-07-22 RX ADMIN — CEPHALEXIN 500 MG: 250 CAPSULE ORAL at 12:00

## 2020-07-22 NOTE — ED NOTES
Finger splint applied to RIGHT pointer digit per orders. Patient tolerated PO and topical ordered medications, see [facility]    Discharge instructions reviewed with the patient with opportunity for questions given. The patient verbalized understanding. Patient armband removed and shredded. Patient in stable condition at time of discharge.

## 2020-07-22 NOTE — ED TRIAGE NOTES
Patient with RIGHT pointer digit injury d/t slamming it via car door yesterday. Patient with concerns of continuous bleeding, contained with band-aid.  Reports \"I think I might need stitches or something\"

## 2020-07-22 NOTE — ED PROVIDER NOTES
EMERGENCY DEPARTMENT HISTORY AND PHYSICAL EXAM    Date: 7/22/2020  Patient Name: Alia Waldrop    History of Presenting Illness     Chief Complaint   Patient presents with    Finger Pain         History Provided By: Patient    Chief Complaint: finger injury    HPI(Context):   10:45 AM  Alia Waldrop is a 68 y.o. male who presents to the emergency department C/O right second finger injury. Associated sxs include laceration to right second finger. Pt states 24 hours ago he slammed his right second finger in car door. Pt cleaned wound and is continued to bleed. Pain worse with movement of finger. Last tetanus 2 years ago. Pt denies numbness, weakness, reduced ROM, blood thinner use, and any other sxs or complaints. PCP: Yves Alcala MD    Current Facility-Administered Medications   Medication Dose Route Frequency Provider Last Rate Last Dose    cephALEXin (KEFLEX) capsule 500 mg  500 mg Oral NOW Vivi Sandoval PA-C        bacitracin 500 unit/gram ointment   Topical NOW Vivi Sandoval PA-C         Current Outpatient Medications   Medication Sig Dispense Refill    cephALEXin (Keflex) 500 mg capsule Take 1 Cap by mouth four (4) times daily for 10 days. Indications: an infection of the skin and the tissue below the skin 40 Cap 0    albuterol (PROVENTIL HFA, VENTOLIN HFA, PROAIR HFA) 90 mcg/actuation inhaler Take 2 Puffs by inhalation every four (4) hours as needed for Wheezing or Shortness of Breath. 1 Inhaler 1    inhalational spacing device 1 Each by Does Not Apply route as needed (to be used with albuterol HFA.). Please dispense one adult spacer 1 Device 0    metoprolol tartrate (LOPRESSOR) 25 mg tablet Take 25 mg by mouth two (2) times a day.  cholecalciferol, vitamin D3, (VITAMIN D3) 2,000 unit tab Take 5,000 Units by mouth daily.  magnesium oxide 500 mg tab Take 500 mg by mouth nightly.  cyanocobalamin 1,000 mcg tablet Take 1,000 mcg by mouth daily.       metFORMIN (GLUCOPHAGE) 1,000 mg tablet Take 1,000 mg by mouth two (2) times daily (with meals).  lisinopril (PRINIVIL, ZESTRIL) 5 mg tablet Take 5 mg by mouth daily.  glipiZIDE SR (GLUCOTROL XL) 5 mg CR tablet Take 10 mg by mouth daily.  co-enzyme Q-10 (CO Q-10) 100 mg capsule Take 100 mg by mouth daily. Past History     Past Medical History:  Past Medical History:   Diagnosis Date    Aneurysm ([facility] [address]     \" not sure where\"    Back problem     Carotid artery aneurysm ([facility] [address] 09/17/2014    [facility]    oral med    Kidney cysts     Pneumonia     Renal calculus 2014    Rib contusion     Sciatica        Past Surgical History:  Past Surgical History:   Procedure Laterality Date    HX CATARACT REMOVAL Bilateral     HX HERNIA REPAIR      HX LITHOTRIPSY  08/07/2014; 2018    x2    HX ORTHOPAEDIC      left ankle ligament       Family History:  No family history on file. Social History:  Social History     Tobacco Use    Smoking status: Never Smoker    Smokeless tobacco: Never Used   Substance Use Topics    Alcohol use: No    Drug use: No       Allergies:  No Known Allergies      Review of Systems   Review of Systems   Musculoskeletal: Positive for arthralgias. Skin: Positive for wound. Neurological: Negative for weakness and numbness. Hematological: Does not bruise/bleed easily. All other systems reviewed and are negative. Physical Exam     Vitals:    07/22/20 1031   BP: 137/74   Pulse: 82   Resp: 16   Temp: 97.7 °F (36.5 °C)   SpO2: 99%   Height: 5' 7\" (1.702 m)     Physical Exam  Vitals signs and nursing note reviewed. Constitutional:       General: He is not in acute distress. Appearance: Normal appearance. Comments:  male in NAD. Alert. HENT:      Head: Normocephalic and atraumatic.       Right Ear: External ear normal.      Left Ear: External ear normal.      Nose: Nose normal.   Eyes:      Conjunctiva/sclera: Conjunctivae normal.   Neck:      Musculoskeletal: Normal range of motion. Cardiovascular:      Rate and Rhythm: Normal rate and regular rhythm. Pulses:           Radial pulses are 2+ on the right side and 2+ on the left side. Heart sounds: Normal heart sounds. Pulmonary:      Effort: Pulmonary effort is normal.      Breath sounds: Normal breath sounds. Musculoskeletal: Normal range of motion. Right hand: He exhibits tenderness, laceration and swelling. He exhibits normal range of motion and normal capillary refill. Normal strength noted. Hands:    Skin:     General: Skin is warm. Capillary Refill: Capillary refill takes less than 2 seconds. Neurological:      Mental Status: He is alert and oriented to person, place, and time. Psychiatric:         Mood and Affect: Mood normal.             Diagnostic Study Results     Labs -   No results found for this or any previous visit (from the past 12 hour(s)). XR 2ND FINGER RT MIN 2 V   Final Result   IMPRESSION:      Vertically oriented fracture distal phalanx right second finger. This is   nondisplaced but may extend into the DIP joint. CT Results  (Last 48 hours)    None        CXR Results  (Last 48 hours)    None          Medications given in the ED-  Medications   cephALEXin (KEFLEX) capsule 500 mg (has no administration in time range)   bacitracin 500 unit/gram ointment (has no administration in time range)         Medical Decision Making   I am the first provider for this patient. I reviewed the vital signs, available nursing notes, past medical history, past surgical history, family history and social history. Vital Signs-Reviewed the patient's vital signs. Pulse Oximetry Analysis - 99% on RA    Records Reviewed: Nursing Notes    Provider Notes (Medical Decision Making): laceration to right second finger 24 hours ago. Will image wound to evaluate for osseous involvement. Perform lac repair given gaping wound. No tendon involvement. NVI.  Tetanus UTD    Procedures:  Wound Repair    Date/Time: 7/22/2020 10:49 AM  Performed by: [id number] provider: Dr Hilda Natarajan  Preparation: skin prepped with Shur-Clens and sterile field established  Time out: Immediately prior to the procedure a time out was called to verify the correct patient, procedure, equipment, staff and marking as appropriate. .  Location details: right index finger  Wound length:2.5 cm or less  Anesthesia: local infiltration    Anesthesia:  Local Anesthetic: lidocaine 1% without epinephrine  Foreign bodies: no foreign bodies  Irrigation solution: tap water and saline  Irrigation method: syringe  Debridement: none  Wound skin closure material used: 5-0 prolene. Number of sutures: 5  Technique: simple  Approximation: close  Dressing: antibiotic ointment and splint  Patient tolerance: Patient tolerated the procedure well with no immediate complications  My total time at bedside, performing this procedure was 1-15 minutes. ED Course:   10:45 AM Initial assessment performed. The patients presenting problems have been discussed, and they are in agreement with the care plan formulated and outlined with them. I have encouraged them to ask questions as they arise throughout their visit. Diagnosis and Disposition     Imaging reveals distal phalanx fx. NVI. Wound open for 24 hours but given gaping nature of lac performed suture repair after extensive cleaning and irrigation with no signs of infection. Finger splinted. PO ABX started in ED. FU with Orthopedist. Tetanus UTD. Suture removal in 7-10 days. Reasons to RTED discussed with pt. All questions answered. Pt feels comfortable going home at this time. Pt expressed understanding and he agrees with plan. 1. Open nondisplaced fracture of distal phalanx of right index finger, initial encounter    2. Laceration of right index finger without foreign body without damage to nail, initial encounter        PLAN:  1. D/C Home  2.    Current Discharge Medication List      START taking these medications    Details   cephALEXin (Keflex) 500 mg capsule Take 1 Cap by mouth four (4) times daily for 10 days. Indications: an infection of the skin and the tissue below the skin  Qty: 40 Cap, Refills: 0           3. Follow-up Information     Follow up With Specialties Details Why Contact Info    Mitali Feliciano MD Orthopedic Surgery   [address] [place] [address]  [phone]      [facility] EMERGENCY DEPT Emergency Medicine  suture removal in 7-10 days [address] [place] [address]  [phone]        _______________________________    Attestations: This note is prepared by Sriram Wilson PA-C.  _______________________________        Please note that this dictation was completed with HipChat, the computer voice recognition software. Quite often unanticipated grammatical, syntax, homophones, and other interpretive errors are inadvertently transcribed by the computer software. Please disregard these errors. Please excuse any errors that have escaped final proofreading.

## 2020-12-23 ENCOUNTER — HOSPITAL ENCOUNTER (EMERGENCY)
Age: 80
Discharge: HOME | End: 2020-12-23
Attending: EMERGENCY MEDICINE
Payer: MEDICARE

## 2020-12-23 VITALS
HEIGHT: 67 IN | SYSTOLIC BLOOD PRESSURE: 110 MMHG | HEART RATE: 106 BPM | WEIGHT: 158 LBS | RESPIRATION RATE: 23 BRPM | DIASTOLIC BLOOD PRESSURE: 63 MMHG | BODY MASS INDEX: 24.8 KG/M2 | TEMPERATURE: 98.7 F | OXYGEN SATURATION: 94 %

## 2020-12-23 DIAGNOSIS — R53.83 FATIGUE, UNSPECIFIED TYPE: ICD-10-CM

## 2020-12-23 DIAGNOSIS — I10 ESSENTIAL HYPERTENSION: ICD-10-CM

## 2020-12-23 DIAGNOSIS — E11.69 TYPE 2 DIABETES MELLITUS WITH OTHER SPECIFIED COMPLICATION, UNSPECIFIED WHETHER LONG TERM INSULIN USE (HCC): ICD-10-CM

## 2020-12-23 DIAGNOSIS — U07.1 COVID-19 VIRUS INFECTION: Primary | ICD-10-CM

## 2020-12-23 LAB
ALBUMIN SERPL-MCNC: 3 G/DL (ref 3.4–5)
ALBUMIN/GLOB SERPL: 0.8 {RATIO} (ref 0.8–1.7)
ALP SERPL-CCNC: 79 U/L (ref 45–117)
ALT SERPL-CCNC: 23 U/L (ref 16–61)
ANION GAP SERPL CALC-SCNC: 9 MMOL/L (ref 3–18)
APTT PPP: 31 SEC (ref 23–36.4)
AST SERPL-CCNC: 35 U/L (ref 10–38)
BASOPHILS # BLD: 0 K/UL (ref 0–0.1)
BASOPHILS NFR BLD: 0 % (ref 0–2)
BILIRUB SERPL-MCNC: 0.5 MG/DL (ref 0.2–1)
BNP SERPL-MCNC: 470 PG/ML (ref 0–1800)
BUN SERPL-MCNC: 18 MG/DL (ref 7–18)
BUN/CREAT SERPL: 12 (ref 12–20)
CALCIUM SERPL-MCNC: 9 MG/DL (ref 8.5–10.1)
CHLORIDE SERPL-SCNC: 103 MMOL/L (ref 100–111)
CK MB CFR SERPL CALC: NORMAL % (ref 0–4)
CK MB SERPL-MCNC: <1 NG/ML (ref 5–25)
CK SERPL-CCNC: 46 U/L (ref 39–308)
CO2 SERPL-SCNC: 24 MMOL/L (ref 21–32)
CREAT SERPL-MCNC: 1.53 MG/DL (ref 0.6–1.3)
DIFFERENTIAL METHOD BLD: ABNORMAL
EOSINOPHIL # BLD: 0 K/UL (ref 0–0.4)
EOSINOPHIL NFR BLD: 0 % (ref 0–5)
ERYTHROCYTE [DISTWIDTH] IN BLOOD BY AUTOMATED COUNT: 12.3 % (ref 11.6–14.5)
GLOBULIN SER CALC-MCNC: 4 G/DL (ref 2–4)
GLUCOSE SERPL-MCNC: 175 MG/DL (ref 74–99)
HCT VFR BLD AUTO: 45.2 % (ref 36–48)
HGB BLD-MCNC: 16.1 G/DL (ref 13–16)
INR PPP: 1 (ref 0.8–1.2)
LACTATE BLD-SCNC: 1.89 MMOL/L (ref 0.4–2)
LYMPHOCYTES # BLD: 0.5 K/UL (ref 0.9–3.6)
LYMPHOCYTES NFR BLD: 8 % (ref 21–52)
MAGNESIUM SERPL-MCNC: 1.3 MG/DL (ref 1.6–2.6)
MCH RBC QN AUTO: 34.8 PG (ref 24–34)
MCHC RBC AUTO-ENTMCNC: 35.6 G/DL (ref 31–37)
MCV RBC AUTO: 97.8 FL (ref 74–97)
MONOCYTES # BLD: 0.8 K/UL (ref 0.05–1.2)
MONOCYTES NFR BLD: 12 % (ref 3–10)
NEUTS SEG # BLD: 5 K/UL (ref 1.8–8)
NEUTS SEG NFR BLD: 80 % (ref 40–73)
PLATELET # BLD AUTO: 180 K/UL (ref 135–420)
PMV BLD AUTO: 10.2 FL (ref 9.2–11.8)
POTASSIUM SERPL-SCNC: 4.6 MMOL/L (ref 3.5–5.5)
PROT SERPL-MCNC: 7 G/DL (ref 6.4–8.2)
PROTHROMBIN TIME: 13.2 SEC (ref 11.5–15.2)
RBC # BLD AUTO: 4.62 M/UL (ref 4.7–5.5)
SODIUM SERPL-SCNC: 136 MMOL/L (ref 136–145)
TROPONIN I SERPL-MCNC: <0.02 NG/ML (ref 0–0.04)
WBC # BLD AUTO: 6.3 K/UL (ref 4.6–13.2)

## 2020-12-23 PROCEDURE — 83880 ASSAY OF NATRIURETIC PEPTIDE: CPT

## 2020-12-23 PROCEDURE — 80053 COMPREHEN METABOLIC PANEL: CPT

## 2020-12-23 PROCEDURE — 83605 ASSAY OF LACTIC ACID: CPT

## 2020-12-23 PROCEDURE — 82550 ASSAY OF CK (CPK): CPT

## 2020-12-23 PROCEDURE — 99285 EMERGENCY DEPT VISIT HI MDM: CPT

## 2020-12-23 PROCEDURE — 93005 ELECTROCARDIOGRAM TRACING: CPT

## 2020-12-23 PROCEDURE — 83735 ASSAY OF MAGNESIUM: CPT

## 2020-12-23 PROCEDURE — 85610 PROTHROMBIN TIME: CPT

## 2020-12-23 PROCEDURE — 85730 THROMBOPLASTIN TIME PARTIAL: CPT

## 2020-12-23 PROCEDURE — 85025 COMPLETE CBC W/AUTO DIFF WBC: CPT

## 2020-12-23 RX ORDER — AZITHROMYCIN 250 MG/1
TABLET, FILM COATED ORAL
Qty: 6 TAB | Refills: 0 | Status: SHIPPED | OUTPATIENT
Start: 2020-12-23

## 2020-12-23 RX ORDER — METHOCARBAMOL 500 MG/1
1000 TABLET, FILM COATED ORAL 3 TIMES DAILY
Qty: 30 TAB | Refills: 0 | OUTPATIENT
Start: 2020-12-23 | End: 2021-07-05

## 2020-12-23 NOTE — ED NOTES
Pt given discharge paperwork by RN, pt verbalizes understanding, pt by wheelchair out of ED. PIV taken prior to pt discharge.

## 2020-12-23 NOTE — ED TRIAGE NOTES
Patient reports tht [facility] when they told him he has [facility] an they told him that he has covid and now he has chills and no appetite

## 2020-12-24 ENCOUNTER — PATIENT OUTREACH (OUTPATIENT)
Dept: CASE MANAGEMENT | Age: 80
End: 2020-12-24

## 2020-12-24 NOTE — PROGRESS NOTES
Date/Time:  12/24/2020 10:02 AM   Call within 2 business days of discharge: Yes   Attempted to reach patient by telephone. Left HIPPA compliant message requesting a return call. Will attempt to reach patient again.

## 2020-12-26 ENCOUNTER — PATIENT OUTREACH (OUTPATIENT)
Dept: CASE MANAGEMENT | Age: 80
End: 2020-12-26

## 2020-12-26 LAB
ATRIAL RATE: 110 BPM
CALCULATED R AXIS, ECG10: 19 DEGREES
CALCULATED T AXIS, ECG11: 56 DEGREES
DIAGNOSIS, 93000: NORMAL
Q-T INTERVAL, ECG07: 492 MS
QRS DURATION, ECG06: 74 MS
QTC CALCULATION (BEZET), ECG08: 656 MS
VENTRICULAR RATE, ECG03: 107 BPM

## 2020-12-26 NOTE — PROGRESS NOTES
Patient contacted regarding COVID-19 diagnosis. Discussed COVID-19 related testing which was not done at this time. Test results were not done. Patient informed of results, if available? yes. Outreach made within 2 business days of discharge: No    Care Transition Nurse/ Ambulatory Care Manager/ LPN Care Coordinator contacted the patient by telephone to perform post discharge assessment. Verified name and  with patient as identifiers. Provided introduction to self, and explanation of the CTN/ACM/LPN role, and reason for call due to risk factors for infection and/or exposure to COVID-19. Symptoms reviewed with patient who verbalized the following symptoms: no new symptoms and no worsening symptoms. Due to no new or worsening symptoms encounter was not routed to provider for escalation. Discussed follow-up appointments. If no appointment was previously scheduled, appointment scheduling offered: [facility] follow up appointment(s): No future appointments. Non-[facility] follow up appointment(s): pcp as needed      Advance Care Planning:   Does patient have an Advance Directive: healthcare decision maker on file    Patient has following risk factors of: diabetes. CTN/ACM/LPN reviewed discharge instructions, medical action plan and red flags such as increased shortness of breath, increasing fever and signs of decompensation with patient who verbalized understanding. Discussed exposure protocols and quarantine with CDC Guidelines What to do if you are sick with coronavirus disease .  Patient was given an opportunity for questions and concerns. The patient agrees to contact the [facility] exposure line [phone], [facility]  ([phone]) and PCP office for questions related to their healthcare. CTN/ACM/LPN provided contact information for future needs.     Reviewed and educated patient on any new and changed medications related to discharge diagnosis. Patient/family/caregiver given information for [employer] and agrees to enroll no  Patient's preferred e-mail:  n/a  Patient's preferred phone number: n/a  Based on Loop alert triggers, patient will be contacted by nurse care manager for worsening symptoms. Plan for follow-up call in 5-7 days based on severity of symptoms and risk factors.

## 2020-12-31 ENCOUNTER — PATIENT OUTREACH (OUTPATIENT)
Dept: CASE MANAGEMENT | Age: 80
End: 2020-12-31

## 2020-12-31 NOTE — PROGRESS NOTES
Patient contacted regarding COVID-19 risk and screening. Discussed COVID-19 related testing which was not done at this time. Test results were not done. Patient informed of results, if available? n/a     Care Transition Nurse/ Ambulatory Care Manager/ LPN Care Coordinator contacted the patient by telephone to perform follow-up assessment. Verified name and  with patient as identifiers. Patient has following risk factors of: diabetes. Symptoms reviewed with patient who verbalized the following symptoms: no symptoms. Due to no new or worsening symptoms encounter was not routed to provider for escalation. Education provided regarding infection prevention, and signs and symptoms of COVID-19 and when to seek medical attention with patient who verbalized understanding. Discussed exposure protocols and quarantine from [address] you at higher risk for severe illness  and given an opportunity for questions and concerns. The patient agrees to contact the COVID-19 hotline [phone] or PCP office for questions related to their healthcare. CTN/ACM/LPN provided contact information for future reference. From CDC: Are you at higher risk for severe illness?  Wash your hands often.  Avoid close contact (6 feet, which is about two arm lengths) with people who are sick.  Put distance between yourself and other people if COVID-19 is spreading in your community.  Clean and disinfect frequently touched surfaces.  Avoid all cruise travel and non-essential air travel.  Call your healthcare professional if you have concerns about COVID-19 and your underlying condition or if you are sick. For more information on steps you can take to protect yourself, see CDC's How to [place] for follow-up call in 5-7 days based on severity of symptoms and risk factors.

## 2021-01-07 ENCOUNTER — PATIENT OUTREACH (OUTPATIENT)
Dept: CASE MANAGEMENT | Age: 81
End: 2021-01-07

## 2021-01-07 NOTE — PROGRESS NOTES
Date/Time:  1/7/2021 2:29 PM   Call within 2 business days of discharge: Yes   Attempted to reach patient by telephone. Left HIPPA compliant message requesting a return call. This episode is resolved.

## 2021-07-05 ENCOUNTER — HOSPITAL ENCOUNTER (EMERGENCY)
Age: 81
Discharge: HOME | End: 2021-07-05
Attending: EMERGENCY MEDICINE
Payer: MEDICARE

## 2021-07-05 ENCOUNTER — APPOINTMENT (OUTPATIENT)
Dept: CT IMAGING | Age: 81
End: 2021-07-05
Attending: PHYSICIAN ASSISTANT
Payer: MEDICARE

## 2021-07-05 VITALS
HEIGHT: 67 IN | HEART RATE: 88 BPM | DIASTOLIC BLOOD PRESSURE: 62 MMHG | BODY MASS INDEX: 24.96 KG/M2 | OXYGEN SATURATION: 95 % | WEIGHT: 159 LBS | RESPIRATION RATE: 16 BRPM | SYSTOLIC BLOOD PRESSURE: 113 MMHG | TEMPERATURE: 98.2 F

## 2021-07-05 DIAGNOSIS — K76.9 LIVER LESION: ICD-10-CM

## 2021-07-05 DIAGNOSIS — M54.50 ACUTE LEFT-SIDED LOW BACK PAIN WITHOUT SCIATICA: Primary | ICD-10-CM

## 2021-07-05 LAB
ANION GAP SERPL CALC-SCNC: 8 MMOL/L (ref 3–18)
APPEARANCE UR: CLEAR
BACTERIA URNS QL MICRO: ABNORMAL /HPF
BASOPHILS # BLD: 0.1 K/UL (ref 0–0.1)
BASOPHILS NFR BLD: 1 % (ref 0–2)
BILIRUB UR QL: NEGATIVE
BUN SERPL-MCNC: 34 MG/DL (ref 7–18)
BUN/CREAT SERPL: 19 (ref 12–20)
CALCIUM SERPL-MCNC: 9.8 MG/DL (ref 8.5–10.1)
CHLORIDE SERPL-SCNC: 106 MMOL/L (ref 100–111)
CO2 SERPL-SCNC: 23 MMOL/L (ref 21–32)
COLOR UR: ABNORMAL
CREAT SERPL-MCNC: 1.83 MG/DL (ref 0.6–1.3)
DIFFERENTIAL METHOD BLD: ABNORMAL
EOSINOPHIL # BLD: 0.3 K/UL (ref 0–0.4)
EOSINOPHIL NFR BLD: 3 % (ref 0–5)
EPITH CASTS URNS QL MICRO: ABNORMAL /LPF (ref 0–5)
ERYTHROCYTE [DISTWIDTH] IN BLOOD BY AUTOMATED COUNT: 12.4 % (ref 11.6–14.5)
GLUCOSE SERPL-MCNC: 203 MG/DL (ref 74–99)
GLUCOSE UR STRIP.AUTO-MCNC: NEGATIVE MG/DL
HCT VFR BLD AUTO: 48.8 % (ref 36–48)
HGB BLD-MCNC: 16.5 G/DL (ref 13–16)
HGB UR QL STRIP: NEGATIVE
KETONES UR QL STRIP.AUTO: ABNORMAL MG/DL
LEUKOCYTE ESTERASE UR QL STRIP.AUTO: NEGATIVE
LYMPHOCYTES # BLD: 1.8 K/UL (ref 0.9–3.6)
LYMPHOCYTES NFR BLD: 20 % (ref 21–52)
MCH RBC QN AUTO: 34.8 PG (ref 24–34)
MCHC RBC AUTO-ENTMCNC: 33.8 G/DL (ref 31–37)
MCV RBC AUTO: 103 FL (ref 74–97)
MONOCYTES # BLD: 0.8 K/UL (ref 0.05–1.2)
MONOCYTES NFR BLD: 9 % (ref 3–10)
MUCOUS THREADS URNS QL MICRO: ABNORMAL /LPF
NEUTS SEG # BLD: 5.9 K/UL (ref 1.8–8)
NEUTS SEG NFR BLD: 66 % (ref 40–73)
NITRITE UR QL STRIP.AUTO: NEGATIVE
PH UR STRIP: 5 [PH] (ref 5–8)
PLATELET # BLD AUTO: 238 K/UL (ref 135–420)
PMV BLD AUTO: 10.3 FL (ref 9.2–11.8)
POTASSIUM SERPL-SCNC: 4.5 MMOL/L (ref 3.5–5.5)
PROT UR STRIP-MCNC: 30 MG/DL
RBC # BLD AUTO: 4.74 M/UL (ref 4.35–5.65)
RBC #/AREA URNS HPF: NEGATIVE /HPF (ref 0–5)
SODIUM SERPL-SCNC: 137 MMOL/L (ref 136–145)
SP GR UR REFRACTOMETRY: 1.02 (ref 1–1.03)
UROBILINOGEN UR QL STRIP.AUTO: 1 EU/DL (ref 0.2–1)
WBC # BLD AUTO: 8.9 K/UL (ref 4.6–13.2)
WBC URNS QL MICRO: ABNORMAL /HPF (ref 0–5)

## 2021-07-05 PROCEDURE — 74011636637 HC RX REV CODE- 636/637: Performed by: PHYSICIAN ASSISTANT

## 2021-07-05 PROCEDURE — 99284 EMERGENCY DEPT VISIT MOD MDM: CPT

## 2021-07-05 PROCEDURE — A9270 NON-COVERED ITEM OR SERVICE: HCPCS | Performed by: PHYSICIAN ASSISTANT

## 2021-07-05 PROCEDURE — 74011250637 HC RX REV CODE- 250/637: Performed by: PHYSICIAN ASSISTANT

## 2021-07-05 PROCEDURE — 80048 BASIC METABOLIC PNL TOTAL CA: CPT

## 2021-07-05 PROCEDURE — 96375 TX/PRO/DX INJ NEW DRUG ADDON: CPT

## 2021-07-05 PROCEDURE — 81001 URINALYSIS AUTO W/SCOPE: CPT

## 2021-07-05 PROCEDURE — 74011250636 HC RX REV CODE- 250/636: Performed by: PHYSICIAN ASSISTANT

## 2021-07-05 PROCEDURE — 85025 COMPLETE CBC W/AUTO DIFF WBC: CPT

## 2021-07-05 PROCEDURE — 96374 THER/PROPH/DIAG INJ IV PUSH: CPT

## 2021-07-05 PROCEDURE — 74176 CT ABD & PELVIS W/O CONTRAST: CPT

## 2021-07-05 RX ORDER — ACETAMINOPHEN 325 MG/1
975 TABLET ORAL
Status: COMPLETED | OUTPATIENT
Start: 2021-07-05 | End: 2021-07-05

## 2021-07-05 RX ORDER — PREDNISONE 20 MG/1
20 TABLET ORAL DAILY
Qty: 5 TABLET | Refills: 0 | Status: SHIPPED | OUTPATIENT
Start: 2021-07-05 | End: 2021-07-10

## 2021-07-05 RX ORDER — METHOCARBAMOL 500 MG/1
500 TABLET, FILM COATED ORAL 3 TIMES DAILY
Qty: 15 TABLET | Refills: 0 | Status: SHIPPED | OUTPATIENT
Start: 2021-07-05 | End: 2021-07-10

## 2021-07-05 RX ORDER — PREDNISONE 20 MG/1
20 TABLET ORAL
Status: COMPLETED | OUTPATIENT
Start: 2021-07-05 | End: 2021-07-05

## 2021-07-05 RX ORDER — ONDANSETRON 2 MG/ML
4 INJECTION INTRAMUSCULAR; INTRAVENOUS
Status: COMPLETED | OUTPATIENT
Start: 2021-07-05 | End: 2021-07-05

## 2021-07-05 RX ORDER — METHOCARBAMOL 500 MG/1
500 TABLET, FILM COATED ORAL ONCE
Status: COMPLETED | OUTPATIENT
Start: 2021-07-05 | End: 2021-07-05

## 2021-07-05 RX ORDER — MORPHINE SULFATE 2 MG/ML
2 INJECTION, SOLUTION INTRAMUSCULAR; INTRAVENOUS
Status: COMPLETED | OUTPATIENT
Start: 2021-07-05 | End: 2021-07-05

## 2021-07-05 RX ADMIN — METHOCARBAMOL TABLETS 500 MG: 500 TABLET, COATED ORAL at 12:29

## 2021-07-05 RX ADMIN — PREDNISONE 20 MG: 20 TABLET ORAL at 14:46

## 2021-07-05 RX ADMIN — ONDANSETRON 4 MG: 2 INJECTION INTRAMUSCULAR; INTRAVENOUS at 13:24

## 2021-07-05 RX ADMIN — ACETAMINOPHEN 975 MG: 325 TABLET ORAL at 12:29

## 2021-07-05 RX ADMIN — MORPHINE SULFATE 2 MG: 2 INJECTION, SOLUTION INTRAMUSCULAR; INTRAVENOUS at 13:24

## 2021-07-05 NOTE — ED PROVIDER NOTES
EMERGENCY DEPARTMENT HISTORY AND PHYSICAL EXAM    Date: 7/5/2021  Patient Name: Maria Fernanda Boateng    History of Presenting Illness     Chief Complaint   Patient presents with    Flank Pain         History Provided By: Patient    12:02 PM  Maria Fernanda Boateng is a 80 y.o. male with PMHX of diabetes, hypertension, chronic diastolic CHF who presents to the emergency department C/O left lower back pain x2 weeks. Worse with certain movements. History of kidney stones, unsure if it feels similar or musculoskeletal.  Denies any injury or heavy lifting. Has taken ibuprofen and Aleve without relief. . Pt denies abdominal pain, dysuria, hematuria, upper back pain, extremity pain numbness or weakness, saddle anesthesia, bowel or bladder incontinence, n/v/d, urinary retention, and any other sxs or complaints. PCP: Jersey Sierra MD    Current Outpatient Medications   Medication Sig Dispense Refill    predniSONE (DELTASONE) 20 mg tablet Take 20 mg by mouth daily for 5 days. With Breakfast 5 Tablet 0    methocarbamoL (Robaxin) 500 mg tablet Take 1 Tablet by mouth three (3) times daily for 5 days. 15 Tablet 0    azithromycin (Zithromax Z-Vic) 250 mg tablet Take two tablets today then one tablet daily until gone 6 Tab 0    albuterol (PROVENTIL HFA, VENTOLIN HFA, PROAIR HFA) 90 mcg/actuation inhaler Take 2 Puffs by inhalation every four (4) hours as needed for Wheezing or Shortness of Breath. 1 Inhaler 1    inhalational spacing device 1 Each by Does Not Apply route as needed (to be used with albuterol HFA.). Please dispense one adult spacer 1 Device 0    metoprolol tartrate (LOPRESSOR) 25 mg tablet Take 25 mg by mouth two (2) times a day.  cholecalciferol, vitamin D3, (VITAMIN D3) 2,000 unit tab Take 5,000 Units by mouth daily.  magnesium oxide 500 mg tab Take 500 mg by mouth nightly.  cyanocobalamin 1,000 mcg tablet Take 1,000 mcg by mouth daily.       metFORMIN (GLUCOPHAGE) 1,000 mg tablet Take 1,000 mg by mouth two (2) times daily (with meals).  lisinopril (PRINIVIL, ZESTRIL) 5 mg tablet Take 5 mg by mouth daily.  glipiZIDE SR (GLUCOTROL XL) 5 mg CR tablet Take 10 mg by mouth daily.  co-enzyme Q-10 (CO Q-10) 100 mg capsule Take 100 mg by mouth daily. Past History     Past Medical History:  Past Medical History:   Diagnosis Date    Aneurysm ([facility]     \" not sure where\"    Back problem     Carotid artery aneurysm ([facility] 09/17/2014    Diabetes ([facility] 2007    oral med    Kidney cysts     Pneumonia     Renal calculus 2014    Rib contusion     Sciatica        Past Surgical History:  Past Surgical History:   Procedure Laterality Date    HX CATARACT REMOVAL Bilateral     HX HERNIA REPAIR      HX LITHOTRIPSY  08/07/2014; 2018    x2    HX ORTHOPAEDIC      left ankle ligament       Family History:  History reviewed. No pertinent family history. Social History:  Social History     Tobacco Use    Smoking status: Never Smoker    Smokeless tobacco: Never Used   Substance Use Topics    Alcohol use: No    Drug use: No       Allergies:  No Known Allergies      Review of Systems   Review of Systems   Constitutional: Negative. Negative for fever. Gastrointestinal: Negative for abdominal pain, nausea and vomiting. Genitourinary: Negative for difficulty urinating, dysuria and hematuria. Musculoskeletal: Positive for back pain. All other systems reviewed and are negative. Physical Exam     Vitals:    07/05/21 1226 07/05/21 1330 07/05/21 1345 07/05/21 1400   BP:  111/60 114/61 113/62   Pulse:       Resp:       Temp:       SpO2: 98% 99% 94% 95%   Weight:       Height:         Physical Exam  Vital signs and nursing notes reviewed. CONSTITUTIONAL: Alert. Well-appearing; well-nourished; in no apparent distress. HEAD: Normocephalic; atraumatic. CV: Normal S1, S2; no murmurs, rubs, or gallops. No chest wall tenderness.   RESPIRATORY: Normal chest excursion with respiration; breath sounds clear and equal bilaterally; no wheezes, rhonchi, or rales. GI: Normal bowel sounds; non-distended; non-tender; no guarding or rigidity; no palpable organomegaly. No CVA tenderness. BACK:  No evidence of trauma or deformity. Tender palpation over left SI joint and left lower lumbar paraspinal muscle. No midline T or L-spine tenderness or deformity. Exacerbation of pain with range of motion, particularly forward flexion. EXT: Normal ROM in all four extremities; non-tender to palpation. BLE: Sensation intact, no foot drop. SKIN: Normal for age and race; warm; dry; good turgor; no apparent lesions or exudate. NEURO: A & O x3. PSYCH:  Mood and affect appropriate. Diagnostic Study Results     Labs -     Recent Results (from the past 12 hour(s))   CBC WITH AUTOMATED DIFF    Collection Time: 07/05/21 12:55 PM   Result Value Ref Range    WBC 8.9 4.6 - 13.2 K/uL    RBC 4.74 4.35 - 5.65 M/uL    HGB 16.5 (H) 13.0 - 16.0 g/dL    HCT 48.8 (H) 36.0 - 48.0 %    .0 (H) 74.0 - 97.0 FL    MCH 34.8 (H) 24.0 - 34.0 PG    MCHC 33.8 31.0 - 37.0 g/dL    RDW 12.4 11.6 - 14.5 %    PLATELET 572 489 - 112 K/uL    MPV 10.3 9.2 - 11.8 FL    NEUTROPHILS 66 40 - 73 %    LYMPHOCYTES 20 (L) 21 - 52 %    MONOCYTES 9 3 - 10 %    EOSINOPHILS 3 0 - 5 %    BASOPHILS 1 0 - 2 %    ABS. NEUTROPHILS 5.9 1.8 - 8.0 K/UL    ABS. LYMPHOCYTES 1.8 0.9 - 3.6 K/UL    ABS. MONOCYTES 0.8 0.05 - 1.2 K/UL    ABS. EOSINOPHILS 0.3 0.0 - 0.4 K/UL    ABS.  BASOPHILS 0.1 0.0 - 0.1 K/UL    DF AUTOMATED     METABOLIC PANEL, BASIC    Collection Time: 07/05/21 12:55 PM   Result Value Ref Range    Sodium 137 136 - 145 mmol/L    Potassium 4.5 3.5 - 5.5 mmol/L    Chloride 106 100 - 111 mmol/L    CO2 23 21 - 32 mmol/L    Anion gap 8 3.0 - 18 mmol/L    Glucose 203 (H) 74 - 99 mg/dL    BUN 34 (H) 7.0 - 18 MG/DL    Creatinine 1.83 (H) 0.6 - 1.3 MG/DL    BUN/Creatinine ratio 19 12 - 20      GFR est AA 43 (L) >60 ml/min/1.73m2    GFR est non-AA 36 (L) >60 ml/min/1.73m2    Calcium 9.8 8.5 - 10.1 MG/DL   URINALYSIS W/ RFLX MICROSCOPIC    Collection Time: 07/05/21  1:30 PM   Result Value Ref Range    Color DARK YELLOW      Appearance CLEAR      Specific gravity 1.023 1.005 - 1.030      pH (UA) 5.0 5.0 - 8.0      Protein 30 (A) NEG mg/dL    Glucose Negative NEG mg/dL    Ketone TRACE (A) NEG mg/dL    Bilirubin Negative NEG      Blood Negative NEG      Urobilinogen 1.0 0.2 - 1.0 EU/dL    Nitrites Negative NEG      Leukocyte Esterase Negative NEG     URINE MICROSCOPIC ONLY    Collection Time: 07/05/21  1:30 PM   Result Value Ref Range    WBC 0 to 3 0 - 5 /hpf    RBC Negative 0 - 5 /hpf    Epithelial cells FEW 0 - 5 /lpf    Bacteria FEW (A) NEG /hpf    Mucus 3+ (A) NEG /lpf       Radiologic Studies -   CT ABD PELV WO CONT   Final Result      1. Bilateral nephrolithiasis with 4 mm stone present within the left renal   pelvis. No evidence of hydronephrosis or hydroureter. 2. Ill-defined peripheral areas of right hepatic lobe hypoattenuation;   potentially a manifestation of geographic steatosis. Consider   nonemergent/outpatient ultrasound follow-up for further characterization. 3. Small volume, chronic right effusion and rounded atelectasis involving the   right lower lobe, unchanged        CT Results  (Last 48 hours)               07/05/21 1245  CT ABD PELV WO CONT Final result    Impression:      1. Bilateral nephrolithiasis with 4 mm stone present within the left renal   pelvis. No evidence of hydronephrosis or hydroureter. 2. Ill-defined peripheral areas of right hepatic lobe hypoattenuation;   potentially a manifestation of geographic steatosis. Consider   nonemergent/outpatient ultrasound follow-up for further characterization.    3. Small volume, chronic right effusion and rounded atelectasis involving the   right lower lobe, unchanged       Narrative:  EXAM: CT of the abdomen and pelvis       INDICATION: Back and flank pain with history of renal stone disease       COMPARISON: CT performed July 16, 2019       TECHNIQUE: Axial CT imaging of the abdomen and pelvis was performed without   intravenous contrast. Multiplanar reformats were generated. One or more dose reduction techniques were used on this CT: automated exposure   control, adjustment of the mAs and/or kVp according to patient size, and   iterative reconstruction techniques. The specific techniques used on this CT   exam have been documented in the patient's electronic medical record. Digital   Imaging and Communications in Medicine (DICOM) format image data are available   to nonaffiliated external healthcare facilities or entities on a secure, media   free, reciprocally searchable basis with patient authorization for at least a   12-month period after this study. _______________       FINDINGS:       LOWER CHEST: Small volume right effusion and right basilar rounded atelectasis   unchanged. No left-sided effusion or alveolar consolidation. Normal cardiac   size. Multivessel coronary arterial atherosclerotic vascular calcification. LIVER, BILIARY: Unenhanced appearance of the liver demonstrates no discrete   abnormality. Heterogeneously hypoechoic area within the subcapsular right   hepatic lobe (axial images 19-34). No biliary dilation. Gallbladder is   unremarkable. PANCREAS: Absent or severely atrophic pancreatic body and tail redemonstrated. Remaining pancreas appears within normal limits. SPLEEN: Normal.       ADRENALS: Normal.       KIDNEYS/URETERS/BLADDER: No hydronephrosis involving either kidney. 4 mm stone   within the left renal pelvis, decreased in size from prior studies. Additional   punctate nonobstructing upper, mid, lower pole right renal stones. Lobular   morphology left renal cyst with additional bilateral renal hypodensities too   small to accurately characterize but favored to reflect small cysts. Urinary   bladder unremarkable.        PELVIC ORGANS: Dystrophic calcifications within the prostate. Evidence of prior   right inguinal hernia repair. VASCULATURE: Diffuse atherosclerosis. LYMPH NODES: No enlarged lymph nodes. GASTROINTESTINAL TRACT: No bowel dilation or wall thickening. No morphology of   bowel obstruction. No free intraperitoneal gas. BONES: No acute or aggressive osseous abnormalities identified. OTHER: None.       _______________               CXR Results  (Last 48 hours)    None          Medications given in the ED-  Medications   methocarbamoL (ROBAXIN) tablet 500 mg (500 mg Oral Given 7/5/21 1229)   acetaminophen (TYLENOL) tablet 975 mg (975 mg Oral Given 7/5/21 1229)   morphine injection 2 mg (2 mg IntraVENous Given 7/5/21 1324)   ondansetron (ZOFRAN) injection 4 mg (4 mg IntraVENous Given 7/5/21 1324)   predniSONE (DELTASONE) tablet 20 mg (20 mg Oral Given 7/5/21 1446)         Medical Decision Making   I am the first provider for this patient. I reviewed the vital signs, available nursing notes, past medical history, past surgical history, family history and social history. Vital Signs-Reviewed the patient's vital signs. Records Reviewed: Nursing Notes and Old Medical Records      Procedures:  Procedures    ED Course:  12:02 PM   Initial assessment performed. The patients presenting problems have been discussed, and they are in agreement with the care plan formulated and outlined with them. I have encouraged them to ask questions as they arise throughout their visit. 1 PM progress note  Patient states his lower back pain is more severe but he does not appear in significant distress. Will give morphine and reassess. Awaiting labs and CT result. Provider Notes (Medical Decision Making): Feliz Aguayo is a 80 y.o. male presented with left lower back pain x1 week. No injury or trauma and worse with movement. No radicular symptoms, fever or abdominal pain.   Urinalysis and labs grossly unremarkable other than a mild CKD, CT abdomen pelvis shows a 4 mm left renal pelvis cyst but no obstructive uropathy or other acute abnormalities. Consistent with musculoskeletal etiology. Patient states he is diabetic but blood sugars have been well controlled. Has not had relief with over-the-counter Tylenol and ibuprofen. He is already established with orthopedist Dr. Cecily Del Roi, so will follow up with him, given short course of low-dose steroid in addition to muscle relaxer, light stretching and moist heat application. Return precautions discussed. Diagnosis and Disposition       DISCHARGE NOTE:    Susan Headings  results have been reviewed with him. He has been counseled regarding his diagnosis, treatment, and plan. He verbally conveys understanding and agreement of the signs, symptoms, diagnosis, treatment and prognosis and additionally agrees to follow up as discussed. He also agrees with the care-plan and conveys that all of his questions have been answered. I have also provided discharge instructions for him that include: educational information regarding their diagnosis and treatment, and list of reasons why they would want to return to the ED prior to their follow-up appointment, should his condition change. He has been provided with education for proper emergency department utilization. CLINICAL IMPRESSION:    1. Acute left-sided low back pain without sciatica    2. Liver lesion        PLAN:  1. D/C Home  2. Current Discharge Medication List      START taking these medications    Details   predniSONE (DELTASONE) 20 mg tablet Take 20 mg by mouth daily for 5 days. With Breakfast  Qty: 5 Tablet, Refills: 0  Start date: 7/5/2021, End date: 7/10/2021         CONTINUE these medications which have CHANGED    Details   methocarbamoL (Robaxin) 500 mg tablet Take 1 Tablet by mouth three (3) times daily for 5 days. Qty: 15 Tablet, Refills: 0  Start date: 7/5/2021, End date: 7/10/2021           3. Follow-up Information     Follow up With Specialties Details Why Contact Info    Jessica Mckeon MD Family Medicine Schedule an appointment as soon as possible for a visit   [address] [facility] [address] Bernard Cardozo MD Orthopedic Surgery Schedule an appointment as soon as possible for a visit   [address] [address]  [phone]      [facility] EMERGENCY DEPT Emergency Medicine  As needed, If symptoms worsen [address] [address]  [phone]        _______________________________      Please note that this dictation was completed with [employer], the computer voice recognition software. Quite often unanticipated grammatical, syntax, homophones, and other interpretive errors are inadvertently transcribed by the computer software. Please disregard these errors. Please excuse any errors that have escaped final proofreading.

## 2021-11-14 NOTE — ED PROVIDER NOTES
EMERGENCY DEPARTMENT HISTORY AND PHYSICAL EXAM    Date: 12/23/2020  Patient Name: Darrel Hutchison    History of Presenting Illness     Chief Complaint   Patient presents with    Chills         History Provided By: Patient    Additional History (Context):   8:47 PM  Darrel Hutchison is a 68 y.o. male with PMHX of diabetes, carotid artery aneurysm, kidney stones, pneumonia, long-term back problems who presents to the emergency department C/O COVID-19 infection. [facility]  He tested positive for the Covid virus. He denies chills and no appetite and asking for the emergency room for evaluation. He denies any chest pain or cough or shortness of breath. He notices food taste differently but is not lost all his taste. Otherwise he is doing well. He has had a history of pneumonia but has never been hospitalized for breathing problems. Social History  Denies smoking drinking or drugs    Family History  Positive for high blood pressure      PCP: Wilder Malone MD    Current Outpatient Medications   Medication Sig Dispense Refill    azithromycin (Zithromax Z-Vic) 250 mg tablet Take two tablets today then one tablet daily until gone 6 Tab 0    methocarbamoL (Robaxin) 500 mg tablet Take 2 Tabs by mouth three (3) times daily. 30 Tab 0    albuterol (PROVENTIL HFA, VENTOLIN HFA, PROAIR HFA) 90 mcg/actuation inhaler Take 2 Puffs by inhalation every four (4) hours as needed for Wheezing or Shortness of Breath. 1 Inhaler 1    inhalational spacing device 1 Each by Does Not Apply route as needed (to be used with albuterol HFA.). Please dispense one adult spacer 1 Device 0    metoprolol tartrate (LOPRESSOR) 25 mg tablet Take 25 mg by mouth two (2) times a day.  cholecalciferol, vitamin D3, (VITAMIN D3) 2,000 unit tab Take 5,000 Units by mouth daily.  magnesium oxide 500 mg tab Take 500 mg by mouth nightly.       cyanocobalamin 1,000 mcg tablet Take 1,000 mcg by mouth daily.      metFORMIN (GLUCOPHAGE) 1,000 mg tablet Take 1,000 mg by mouth two (2) times daily (with meals).  lisinopril (PRINIVIL, ZESTRIL) 5 mg tablet Take 5 mg by mouth daily.  glipiZIDE SR (GLUCOTROL XL) 5 mg CR tablet Take 10 mg by mouth daily.  co-enzyme Q-10 (CO Q-10) 100 mg capsule Take 100 mg by mouth daily. Past History     Past Medical History:  Past Medical History:   Diagnosis Date    Aneurysm ([facility]     \" not sure where\"    Back problem     Carotid artery aneurysm ([facility] 09/17/2014    Diabetes ([facility] 2007    oral med    Kidney cysts     Pneumonia     Renal calculus 2014    Rib contusion     Sciatica        Past Surgical History:  Past Surgical History:   Procedure Laterality Date    HX CATARACT REMOVAL Bilateral     HX HERNIA REPAIR      HX LITHOTRIPSY  08/07/2014; 2018    x2    HX ORTHOPAEDIC      left ankle ligament       Family History:  History reviewed. No pertinent family history. Social History:  Social History     Tobacco Use    Smoking status: Never Smoker    Smokeless tobacco: Never Used   Substance Use Topics    Alcohol use: No    Drug use: No       Allergies:  No Known Allergies      Review of Systems   Review of Systems   Constitutional: Positive for chills and fatigue. Respiratory: Negative for cough, chest tightness and shortness of breath. Cardiovascular: Negative for chest pain. Neurological: Negative for speech difficulty. Hematological: Does not bruise/bleed easily. All other systems reviewed and are negative. Physical Exam     Vitals:    12/23/20 1345 12/23/20 1400 12/23/20 1415 12/23/20 1430   BP: 106/71 109/65 110/64 110/63   Pulse: (!) 105 (!) 106 (!) 106 (!) 106   Resp: 29 30 26 23   Temp:       SpO2: 92% 93% 93% 94%   Weight:       Height:         Physical Exam  Vitals signs and nursing note reviewed. Constitutional:       General: He is not in acute distress. Appearance: He is well-developed. He is not diaphoretic. HENT:      Head: Normocephalic and atraumatic. Eyes:      General: No visual field deficit or scleral icterus. Extraocular Movements:      Right eye: Normal extraocular motion. Left eye: Normal extraocular motion. Conjunctiva/sclera: Conjunctivae normal.      Pupils: Pupils are equal, round, and reactive to light. Neck:      Musculoskeletal: Normal range of motion and neck supple. Trachea: No tracheal deviation. Cardiovascular:      Rate and Rhythm: Regular rhythm. Tachycardia present. Heart sounds: Normal heart sounds. Pulmonary:      Effort: Pulmonary effort is normal. No respiratory distress. Breath sounds: Normal breath sounds. No stridor. Abdominal:      General: Bowel sounds are normal. There is no distension. Palpations: Abdomen is soft. Tenderness: There is no abdominal tenderness. There is no rebound. Musculoskeletal: Normal range of motion. General: No tenderness. Comments: Grossly unremarkable without abnormalities   Skin:     General: Skin is warm and dry. Capillary Refill: Capillary refill takes less than 2 seconds. Findings: No erythema or rash. Neurological:      Mental Status: He is alert and oriented to person, place, and time. GCS: GCS eye subscore is 4. GCS verbal subscore is 5. GCS motor subscore is 6. Cranial Nerves: No cranial nerve deficit, dysarthria or facial asymmetry. Sensory: Sensation is intact. Motor: No weakness. Coordination: Coordination is intact. Psychiatric:         Mood and Affect: Mood normal.         Behavior: Behavior normal.         Thought Content: Thought content normal.         Judgment: Judgment normal.         Diagnostic Study Results     Labs -   No results found for this or any previous visit (from the past 12 hour(s)).     Radiologic Studies -   No orders to display     CT Results  (Last 48 hours)    None        CXR Results  (Last 48 hours)    None Medications given in the ED-  Medications - No data to display      Medical Decision Making   I am the first provider for this patient. I reviewed the vital signs, available nursing notes, past medical history, past surgical history, family history and social history. Vital Signs-Reviewed the patient's vital signs. Pulse Oximetry Analysis -94% on room air    Cardiac Monitor:  Rate: 102 bpm  Rhythm: Sinus rhythm    EKG interpretation: (Preliminary)  12:59 PM    Sinus tachycardia, rate 107, nonspecific ST changes, QTC is 492  EKG read by Nate Tomlinson MD    Records Reviewed: NURSING NOTES AND PREVIOUS MEDICAL RECORDS    Provider Notes (Medical Decision Making): This is an elderly white male tested positive for the coronavirus basically complains of chills and fatigue. Otherwise he has no significant chest pain or toxemia. His EKG looks unremarkable mild O2 deficit he is not oxygen requiring. Cardiac enzymes do not support the diagnosis of myocarditis. She shows no signs of heart failure. There are no major electrolyte abnormalities aside from diminished magnesium. He will be discharged to follow-up with his regular doctor or  encouraged to return to the emergency department should symptoms worsen. .    Procedures:  Procedures    ED Course:   1:47 PM: Initial assessment performed. The patients presenting problems have been discussed, and they are in agreement with the care plan formulated and outlined with them. I have encouraged them to ask questions as they arise throughout their visit. Diagnosis and Disposition       DISCHARGE NOTE:  2:36 PM  Michel Jiang's  results have been reviewed with him. He has been counseled regarding his diagnosis, treatment, and plan. He verbally conveys understanding and agreement of the signs, symptoms, diagnosis, treatment and prognosis and additionally agrees to follow up as discussed.   He also agrees with the care-plan and conveys that all of his questions have been answered. I have also provided discharge instructions for him that include: educational information regarding their diagnosis and treatment, and list of reasons why they would want to return to the ED prior to their follow-up appointment, should his condition change. He has been provided with education for proper emergency department utilization. CLINICAL IMPRESSION:    1. COVID-19 virus infection    2. Fatigue, unspecified type    3. Essential hypertension    4. Type 2 diabetes mellitus with other specified complication, unspecified whether long term insulin use ([facility]        PLAN:  1. D/C Home  2. Discharge Medication List as of 12/26/2020 12:13 PM      START taking these medications    Details   azithromycin (Zithromax Z-Vic) 250 mg tablet Take two tablets today then one tablet daily until gone, Normal, Disp-6 Tab, R-0      methocarbamoL (Robaxin) 500 mg tablet Take 2 Tabs by mouth three (3) times daily. , Normal, Disp-30 Tab, R-0         CONTINUE these medications which have NOT CHANGED    Details   albuterol (PROVENTIL HFA, VENTOLIN HFA, PROAIR HFA) 90 mcg/actuation inhaler Take 2 Puffs by inhalation every four (4) hours as needed for Wheezing or Shortness of Breath., Print, Disp-1 Inhaler, R-1      inhalational spacing device 1 Each by Does Not Apply route as needed (to be used with albuterol HFA.). Please dispense one adult spacer, Print, Disp-1 Device, R-0      metoprolol tartrate (LOPRESSOR) 25 mg tablet Take 25 mg by mouth two (2) times a day., Historical Med      cholecalciferol, vitamin D3, (VITAMIN D3) 2,000 unit tab Take 5,000 Units by mouth daily. , Historical Med      magnesium oxide 500 mg tab Take 500 mg by mouth nightly., Historical Med      cyanocobalamin 1,000 mcg tablet Take 1,000 mcg by mouth daily. , Historical Med      metFORMIN (GLUCOPHAGE) 1,000 mg tablet Take 1,000 mg by mouth two (2) times daily (with meals). , Historical Med      lisinopril (PRINIVIL, ZESTRIL) 5 mg tablet Take 5 mg by mouth daily. , Historical Med      glipiZIDE SR (GLUCOTROL XL) 5 mg CR tablet Take 10 mg by mouth daily. , Historical Med      co-enzyme Q-10 (CO Q-10) 100 mg capsule Take 100 mg by mouth daily. , Historical Med           3. Follow-up Information     Follow up With Specialties Details Why Contact Info    Calli Fischer MD Family Medicine Schedule an appointment as soon as possible for a visit   [address] [place] [address] [phone]      THE [facility] EMERGENCY DEPT Emergency Medicine  As needed [address] [address]  [phone]        _______________________________    This note was partially transcribed via voice recognition software. Although efforts have been made to catch any discrepancies, it may contain sound alike words, grammatical errors, or nonsensical words. dc: yuniel reviewed w pt. Is eating and drinking comfortably I nER. No reoccurrence of palpitations. Will send abx to pharmacy

## 2022-03-18 PROBLEM — R42 DIZZINESS: Status: ACTIVE | Noted: 2020-02-14

## 2022-03-19 PROBLEM — R73.9 HYPERGLYCEMIA: Status: ACTIVE | Noted: 2020-02-14

## 2023-10-24 ENCOUNTER — APPOINTMENT (OUTPATIENT)
Facility: HOSPITAL | Age: 83
DRG: 178 | End: 2023-10-24
Payer: MEDICARE

## 2023-10-24 ENCOUNTER — HOSPITAL ENCOUNTER (INPATIENT)
Facility: HOSPITAL | Age: 83
LOS: 1 days | Discharge: HOME | DRG: 178 | End: 2023-10-26
Attending: EMERGENCY MEDICINE | Admitting: HOSPITALIST
Payer: MEDICARE

## 2023-10-24 DIAGNOSIS — U07.1 COVID: Primary | ICD-10-CM

## 2023-10-24 PROBLEM — R47.81 SLURRED SPEECH: Status: ACTIVE | Noted: 2023-10-24

## 2023-10-24 PROBLEM — N18.30 STAGE 3 CHRONIC KIDNEY DISEASE (HCC): Status: ACTIVE | Noted: 2023-10-24

## 2023-10-24 PROBLEM — R00.0 TACHYCARDIA: Status: ACTIVE | Noted: 2023-10-24

## 2023-10-24 LAB
ALBUMIN SERPL-MCNC: 3.5 G/DL (ref 3.4–5)
ALBUMIN/GLOB SERPL: 0.8 (ref 0.8–1.7)
ALP SERPL-CCNC: 73 U/L (ref 45–117)
ALT SERPL-CCNC: 15 U/L (ref 16–61)
AMPHET UR QL SCN: NEGATIVE
ANION GAP SERPL CALC-SCNC: 9 MMOL/L (ref 3–18)
APAP SERPL-MCNC: <2 UG/ML (ref 10–30)
APPEARANCE UR: CLEAR
AST SERPL-CCNC: 12 U/L (ref 10–38)
BARBITURATES UR QL SCN: NEGATIVE
BASOPHILS # BLD: 0 K/UL (ref 0–0.1)
BASOPHILS NFR BLD: 0 % (ref 0–2)
BENZODIAZ UR QL: NEGATIVE
BILIRUB SERPL-MCNC: 0.7 MG/DL (ref 0.2–1)
BILIRUB UR QL: NEGATIVE
BUN SERPL-MCNC: 16 MG/DL (ref 7–18)
BUN/CREAT SERPL: 9 (ref 12–20)
CALCIUM SERPL-MCNC: 9.3 MG/DL (ref 8.5–10.1)
CANNABINOIDS UR QL SCN: NEGATIVE
CHLORIDE SERPL-SCNC: 98 MMOL/L (ref 100–111)
CO2 SERPL-SCNC: 27 MMOL/L (ref 21–32)
COCAINE UR QL SCN: NEGATIVE
COLOR UR: YELLOW
CREAT SERPL-MCNC: 1.83 MG/DL (ref 0.6–1.3)
DIFFERENTIAL METHOD BLD: ABNORMAL
EKG ATRIAL RATE: 114 BPM
EKG DIAGNOSIS: NORMAL
EKG P AXIS: 58 DEGREES
EKG P-R INTERVAL: 190 MS
EKG Q-T INTERVAL: 282 MS
EKG QRS DURATION: 80 MS
EKG QTC CALCULATION (BAZETT): 388 MS
EKG R AXIS: 29 DEGREES
EKG T AXIS: 61 DEGREES
EKG VENTRICULAR RATE: 114 BPM
EOSINOPHIL # BLD: 0 K/UL (ref 0–0.4)
EOSINOPHIL NFR BLD: 0 % (ref 0–5)
EPITH CASTS URNS QL MICRO: NORMAL /LPF (ref 0–5)
ERYTHROCYTE [DISTWIDTH] IN BLOOD BY AUTOMATED COUNT: 12.3 % (ref 11.6–14.5)
EST. AVERAGE GLUCOSE BLD GHB EST-MCNC: 163 MG/DL
ETHANOL SERPL-MCNC: <3 MG/DL (ref 0–3)
FLUAV RNA SPEC QL NAA+PROBE: NOT DETECTED
FLUBV RNA SPEC QL NAA+PROBE: NOT DETECTED
GLOBULIN SER CALC-MCNC: 4.4 G/DL (ref 2–4)
GLUCOSE BLD STRIP.AUTO-MCNC: 197 MG/DL (ref 70–110)
GLUCOSE BLD STRIP.AUTO-MCNC: 197 MG/DL (ref 70–110)
GLUCOSE BLD STRIP.AUTO-MCNC: 207 MG/DL (ref 70–110)
GLUCOSE BLD STRIP.AUTO-MCNC: 293 MG/DL (ref 70–110)
GLUCOSE SERPL-MCNC: 322 MG/DL (ref 74–99)
GLUCOSE UR STRIP.AUTO-MCNC: >1000 MG/DL
HBA1C MFR BLD: 7.3 % (ref 4.2–5.6)
HCT VFR BLD AUTO: 46.7 % (ref 36–48)
HGB BLD-MCNC: 15.8 G/DL (ref 13–16)
HGB UR QL STRIP: ABNORMAL
IMM GRANULOCYTES # BLD AUTO: 0 K/UL (ref 0–0.04)
IMM GRANULOCYTES NFR BLD AUTO: 0 % (ref 0–0.5)
KETONES UR QL STRIP.AUTO: ABNORMAL MG/DL
LEUKOCYTE ESTERASE UR QL STRIP.AUTO: NEGATIVE
LYMPHOCYTES # BLD: 1 K/UL (ref 0.9–3.6)
LYMPHOCYTES NFR BLD: 10 % (ref 21–52)
Lab: ABNORMAL
MCH RBC QN AUTO: 34.5 PG (ref 24–34)
MCHC RBC AUTO-ENTMCNC: 33.8 G/DL (ref 31–37)
MCV RBC AUTO: 102 FL (ref 78–100)
METHADONE UR QL: ABNORMAL
MONOCYTES # BLD: 1.4 K/UL (ref 0.05–1.2)
MONOCYTES NFR BLD: 14 % (ref 3–10)
NEUTS SEG # BLD: 8 K/UL (ref 1.8–8)
NEUTS SEG NFR BLD: 76 % (ref 40–73)
NITRITE UR QL STRIP.AUTO: NEGATIVE
NRBC # BLD: 0 K/UL (ref 0–0.01)
NRBC BLD-RTO: 0 PER 100 WBC
NT PRO BNP: 718 PG/ML (ref 0–1800)
OPIATES UR QL: NEGATIVE
PCP UR QL: NEGATIVE
PH UR STRIP: 5.5 (ref 5–8)
PLATELET # BLD AUTO: 201 K/UL (ref 135–420)
PMV BLD AUTO: 10.2 FL (ref 9.2–11.8)
POTASSIUM SERPL-SCNC: 4.1 MMOL/L (ref 3.5–5.5)
PROT SERPL-MCNC: 7.9 G/DL (ref 6.4–8.2)
PROT UR STRIP-MCNC: 100 MG/DL
RBC # BLD AUTO: 4.58 M/UL (ref 4.35–5.65)
RBC #/AREA URNS HPF: NORMAL /HPF (ref 0–5)
SALICYLATES SERPL-MCNC: <1.7 MG/DL (ref 2.8–20)
SARS-COV-2 RNA RESP QL NAA+PROBE: DETECTED
SODIUM SERPL-SCNC: 134 MMOL/L (ref 136–145)
SP GR UR REFRACTOMETRY: 1.02 (ref 1–1.03)
TROPONIN I SERPL HS-MCNC: 10 NG/L (ref 0–78)
TROPONIN I SERPL HS-MCNC: 12 NG/L (ref 0–78)
UROBILINOGEN UR QL STRIP.AUTO: 0.2 EU/DL (ref 0.2–1)
WBC # BLD AUTO: 10.5 K/UL (ref 4.6–13.2)
WBC URNS QL MICRO: NORMAL /HPF (ref 0–5)

## 2023-10-24 PROCEDURE — 80053 COMPREHEN METABOLIC PANEL: CPT

## 2023-10-24 PROCEDURE — 6360000002 HC RX W HCPCS: Performed by: INTERNAL MEDICINE

## 2023-10-24 PROCEDURE — 96372 THER/PROPH/DIAG INJ SC/IM: CPT

## 2023-10-24 PROCEDURE — 6360000002 HC RX W HCPCS: Performed by: HOSPITALIST

## 2023-10-24 PROCEDURE — 87636 SARSCOV2 & INF A&B AMP PRB: CPT

## 2023-10-24 PROCEDURE — 6370000000 HC RX 637 (ALT 250 FOR IP): Performed by: HOSPITALIST

## 2023-10-24 PROCEDURE — 70551 MRI BRAIN STEM W/O DYE: CPT

## 2023-10-24 PROCEDURE — 85025 COMPLETE CBC W/AUTO DIFF WBC: CPT

## 2023-10-24 PROCEDURE — 70450 CT HEAD/BRAIN W/O DYE: CPT

## 2023-10-24 PROCEDURE — 96361 HYDRATE IV INFUSION ADD-ON: CPT

## 2023-10-24 PROCEDURE — G0378 HOSPITAL OBSERVATION PER HR: HCPCS

## 2023-10-24 PROCEDURE — 82962 GLUCOSE BLOOD TEST: CPT

## 2023-10-24 PROCEDURE — 6370000000 HC RX 637 (ALT 250 FOR IP): Performed by: EMERGENCY MEDICINE

## 2023-10-24 PROCEDURE — 36415 COLL VENOUS BLD VENIPUNCTURE: CPT

## 2023-10-24 PROCEDURE — 2580000003 HC RX 258: Performed by: INTERNAL MEDICINE

## 2023-10-24 PROCEDURE — 6360000004 HC RX CONTRAST MEDICATION: Performed by: EMERGENCY MEDICINE

## 2023-10-24 PROCEDURE — 83880 ASSAY OF NATRIURETIC PEPTIDE: CPT

## 2023-10-24 PROCEDURE — 80179 DRUG ASSAY SALICYLATE: CPT

## 2023-10-24 PROCEDURE — XW033E5 INTRODUCTION OF REMDESIVIR ANTI-INFECTIVE INTO PERIPHERAL VEIN, PERCUTANEOUS APPROACH, NEW TECHNOLOGY GROUP 5: ICD-10-PCS | Performed by: HOSPITALIST

## 2023-10-24 PROCEDURE — 71045 X-RAY EXAM CHEST 1 VIEW: CPT

## 2023-10-24 PROCEDURE — 80143 DRUG ASSAY ACETAMINOPHEN: CPT

## 2023-10-24 PROCEDURE — 99285 EMERGENCY DEPT VISIT HI MDM: CPT

## 2023-10-24 PROCEDURE — 83036 HEMOGLOBIN GLYCOSYLATED A1C: CPT

## 2023-10-24 PROCEDURE — 93005 ELECTROCARDIOGRAM TRACING: CPT | Performed by: EMERGENCY MEDICINE

## 2023-10-24 PROCEDURE — 84484 ASSAY OF TROPONIN QUANT: CPT

## 2023-10-24 PROCEDURE — 2580000003 HC RX 258: Performed by: HOSPITALIST

## 2023-10-24 PROCEDURE — 2580000003 HC RX 258: Performed by: EMERGENCY MEDICINE

## 2023-10-24 PROCEDURE — 71275 CT ANGIOGRAPHY CHEST: CPT

## 2023-10-24 PROCEDURE — 81001 URINALYSIS AUTO W/SCOPE: CPT

## 2023-10-24 PROCEDURE — 80307 DRUG TEST PRSMV CHEM ANLYZR: CPT

## 2023-10-24 PROCEDURE — 96365 THER/PROPH/DIAG IV INF INIT: CPT

## 2023-10-24 PROCEDURE — 82077 ASSAY SPEC XCP UR&BREATH IA: CPT

## 2023-10-24 RX ORDER — POLYETHYLENE GLYCOL 3350 17 G/17G
17 POWDER, FOR SOLUTION ORAL DAILY PRN
Status: DISCONTINUED | OUTPATIENT
Start: 2023-10-24 | End: 2023-10-26 | Stop reason: HOSPADM

## 2023-10-24 RX ORDER — ASPIRIN 81 MG/1
81 TABLET, CHEWABLE ORAL DAILY
Status: DISCONTINUED | OUTPATIENT
Start: 2023-10-24 | End: 2023-10-26 | Stop reason: HOSPADM

## 2023-10-24 RX ORDER — INSULIN LISPRO 100 [IU]/ML
0-4 INJECTION, SOLUTION INTRAVENOUS; SUBCUTANEOUS NIGHTLY
Status: DISCONTINUED | OUTPATIENT
Start: 2023-10-24 | End: 2023-10-26 | Stop reason: HOSPADM

## 2023-10-24 RX ORDER — ENOXAPARIN SODIUM 100 MG/ML
40 INJECTION SUBCUTANEOUS DAILY
Status: DISCONTINUED | OUTPATIENT
Start: 2023-10-24 | End: 2023-10-26 | Stop reason: HOSPADM

## 2023-10-24 RX ORDER — LABETALOL HYDROCHLORIDE 5 MG/ML
10 INJECTION, SOLUTION INTRAVENOUS EVERY 10 MIN PRN
Status: DISCONTINUED | OUTPATIENT
Start: 2023-10-24 | End: 2023-10-26 | Stop reason: HOSPADM

## 2023-10-24 RX ORDER — ONDANSETRON 2 MG/ML
4 INJECTION INTRAMUSCULAR; INTRAVENOUS EVERY 6 HOURS PRN
Status: DISCONTINUED | OUTPATIENT
Start: 2023-10-24 | End: 2023-10-26 | Stop reason: HOSPADM

## 2023-10-24 RX ORDER — DEXTROSE MONOHYDRATE 100 MG/ML
INJECTION, SOLUTION INTRAVENOUS CONTINUOUS PRN
Status: DISCONTINUED | OUTPATIENT
Start: 2023-10-24 | End: 2023-10-26 | Stop reason: HOSPADM

## 2023-10-24 RX ORDER — SODIUM CHLORIDE 9 MG/ML
INJECTION, SOLUTION INTRAVENOUS CONTINUOUS
Status: DISCONTINUED | OUTPATIENT
Start: 2023-10-24 | End: 2023-10-24

## 2023-10-24 RX ORDER — PANTOPRAZOLE SODIUM 40 MG/1
40 TABLET, DELAYED RELEASE ORAL
Status: DISCONTINUED | OUTPATIENT
Start: 2023-10-25 | End: 2023-10-26 | Stop reason: HOSPADM

## 2023-10-24 RX ORDER — SODIUM CHLORIDE 9 MG/ML
INJECTION, SOLUTION INTRAVENOUS CONTINUOUS
Status: DISPENSED | OUTPATIENT
Start: 2023-10-24 | End: 2023-10-25

## 2023-10-24 RX ORDER — VITAMIN B COMPLEX
2000 TABLET ORAL DAILY
Status: DISCONTINUED | OUTPATIENT
Start: 2023-10-24 | End: 2023-10-26 | Stop reason: HOSPADM

## 2023-10-24 RX ORDER — 0.9 % SODIUM CHLORIDE 0.9 %
1000 INTRAVENOUS SOLUTION INTRAVENOUS ONCE
Status: COMPLETED | OUTPATIENT
Start: 2023-10-24 | End: 2023-10-24

## 2023-10-24 RX ORDER — INSULIN GLARGINE 100 [IU]/ML
5 INJECTION, SOLUTION SUBCUTANEOUS DAILY
Status: DISCONTINUED | OUTPATIENT
Start: 2023-10-24 | End: 2023-10-26 | Stop reason: HOSPADM

## 2023-10-24 RX ORDER — GUAIFENESIN/DEXTROMETHORPHAN 100-10MG/5
5 SYRUP ORAL EVERY 4 HOURS PRN
Status: DISCONTINUED | OUTPATIENT
Start: 2023-10-24 | End: 2023-10-26 | Stop reason: HOSPADM

## 2023-10-24 RX ORDER — PROMETHAZINE HYDROCHLORIDE 25 MG/1
12.5 TABLET ORAL EVERY 6 HOURS PRN
Status: DISCONTINUED | OUTPATIENT
Start: 2023-10-24 | End: 2023-10-26 | Stop reason: HOSPADM

## 2023-10-24 RX ORDER — GUAIFENESIN 600 MG/1
600 TABLET, EXTENDED RELEASE ORAL 2 TIMES DAILY
Status: DISCONTINUED | OUTPATIENT
Start: 2023-10-24 | End: 2023-10-26 | Stop reason: HOSPADM

## 2023-10-24 RX ORDER — ALBUTEROL SULFATE 90 UG/1
2 AEROSOL, METERED RESPIRATORY (INHALATION) EVERY 6 HOURS PRN
Status: DISCONTINUED | OUTPATIENT
Start: 2023-10-24 | End: 2023-10-26 | Stop reason: HOSPADM

## 2023-10-24 RX ORDER — INSULIN LISPRO 100 [IU]/ML
0-8 INJECTION, SOLUTION INTRAVENOUS; SUBCUTANEOUS
Status: DISCONTINUED | OUTPATIENT
Start: 2023-10-24 | End: 2023-10-26 | Stop reason: HOSPADM

## 2023-10-24 RX ORDER — ROSUVASTATIN CALCIUM 10 MG/1
40 TABLET, COATED ORAL NIGHTLY
Status: DISCONTINUED | OUTPATIENT
Start: 2023-10-24 | End: 2023-10-26 | Stop reason: HOSPADM

## 2023-10-24 RX ORDER — ASPIRIN 300 MG/1
300 SUPPOSITORY RECTAL DAILY
Status: DISCONTINUED | OUTPATIENT
Start: 2023-10-24 | End: 2023-10-26 | Stop reason: HOSPADM

## 2023-10-24 RX ORDER — ACETAMINOPHEN 500 MG
1000 TABLET ORAL
Status: COMPLETED | OUTPATIENT
Start: 2023-10-24 | End: 2023-10-24

## 2023-10-24 RX ADMIN — REMDESIVIR 100 MG: 100 INJECTION, POWDER, LYOPHILIZED, FOR SOLUTION INTRAVENOUS at 15:37

## 2023-10-24 RX ADMIN — GUAIFENESIN 600 MG: 600 TABLET, EXTENDED RELEASE ORAL at 12:36

## 2023-10-24 RX ADMIN — SODIUM CHLORIDE 1000 ML: 9 INJECTION, SOLUTION INTRAVENOUS at 07:56

## 2023-10-24 RX ADMIN — GUAIFENESIN 600 MG: 600 TABLET, EXTENDED RELEASE ORAL at 21:08

## 2023-10-24 RX ADMIN — ASPIRIN 81 MG: 81 TABLET, CHEWABLE ORAL at 12:22

## 2023-10-24 RX ADMIN — ACETAMINOPHEN 1000 MG: 500 TABLET ORAL at 07:59

## 2023-10-24 RX ADMIN — ROSUVASTATIN CALCIUM 40 MG: 10 TABLET, COATED ORAL at 21:07

## 2023-10-24 RX ADMIN — IOPAMIDOL 100 ML: 755 INJECTION, SOLUTION INTRAVENOUS at 09:02

## 2023-10-24 RX ADMIN — METOPROLOL TARTRATE 25 MG: 25 TABLET, FILM COATED ORAL at 13:06

## 2023-10-24 RX ADMIN — MOMETASONE FUROATE AND FORMOTEROL FUMARATE DIHYDRATE 2 PUFF: 100; 5 AEROSOL RESPIRATORY (INHALATION) at 21:07

## 2023-10-24 RX ADMIN — Medication 2000 UNITS: at 12:36

## 2023-10-24 RX ADMIN — MOMETASONE FUROATE AND FORMOTEROL FUMARATE DIHYDRATE 2 PUFF: 100; 5 AEROSOL RESPIRATORY (INHALATION) at 15:00

## 2023-10-24 RX ADMIN — SODIUM CHLORIDE: 9 INJECTION, SOLUTION INTRAVENOUS at 13:05

## 2023-10-24 RX ADMIN — REMDESIVIR 100 MG: 100 INJECTION, POWDER, LYOPHILIZED, FOR SOLUTION INTRAVENOUS at 15:00

## 2023-10-24 RX ADMIN — INSULIN GLARGINE 5 UNITS: 100 INJECTION, SOLUTION SUBCUTANEOUS at 13:06

## 2023-10-24 RX ADMIN — METOPROLOL TARTRATE 25 MG: 25 TABLET, FILM COATED ORAL at 21:08

## 2023-10-24 RX ADMIN — ENOXAPARIN SODIUM 40 MG: 100 INJECTION SUBCUTANEOUS at 12:21

## 2023-10-24 ASSESSMENT — PAIN - FUNCTIONAL ASSESSMENT
PAIN_FUNCTIONAL_ASSESSMENT: NONE - DENIES PAIN
PAIN_FUNCTIONAL_ASSESSMENT: 0-10

## 2023-10-24 ASSESSMENT — PAIN SCALES - GENERAL
PAINLEVEL_OUTOF10: 0
PAINLEVEL_OUTOF10: 0

## 2023-10-24 NOTE — ED NOTES
Pt placed in gown and on Mike Hoskins MD at the bedside to assess patient. Vs stable at this time. Wife at the bedside.     IV being attempted by SAMANTHA Hauser RN  10/24/23 0562

## 2023-10-24 NOTE — ED PROVIDER NOTES
EMERGENCY DEPARTMENT HISTORY AND PHYSICAL EXAM      Date: 10/24/2023  Patient Name: Cherelle Max    History of Presenting Illness     Chief Complaint   Patient presents with    Fatigue     Pt presents to triage with sx starting Monday @ 0200 when pt had sudden difficulty to ambulate with slurred speech that has \"mostly resolved\" per pts wife. Pt also c/o decreased PO intake, sore throat, n/v, hx diabetes. Per wife pt has had generalized weakness and fatigue \"laying in bed\" since then. History Provided By: Patient    HPI: Cherelle Max, 82 y.o. male with PMHx as noted below presents the emergency department with complaints of fatigue, generalized weakness. Patient also noted to have some intermittent slurred speech and ambulatory issues began a yesterday. This reportedly has been going on intermittently, again occurred this morning prompting him to come to the ED for evaluation. Patient does also note some fatigue, cough, chills over the last 2 days as well.       PCP: Richardson Haas MD    Current Facility-Administered Medications   Medication Dose Route Frequency Provider Last Rate Last Admin    glucose chewable tablet 16 g  4 tablet Oral PRN Harshad Manuel MD        dextrose bolus 10% 125 mL  125 mL IntraVENous PRN Harshad Manuel MD        Or    dextrose bolus 10% 250 mL  250 mL IntraVENous PRN Harshad Manuel MD        glucagon (rDNA) injection 1 mg  1 mg SubCUTAneous PRN Harshad Manuel MD        dextrose 10 % infusion   IntraVENous Continuous PRN Harshad Manuel MD        insulin lispro (HUMALOG) injection vial 0-8 Units  0-8 Units SubCUTAneous TID WC Harshad Manuel MD        insulin lispro (HUMALOG) injection vial 0-4 Units  0-4 Units SubCUTAneous Nightly Harshad Manuel MD        promethazine (PHENERGAN) tablet 12.5 mg  12.5 mg Oral Q6H PRN Harshad Manuel MD        Or    ondansetron [facility]) injection 4 mg  4 mg IntraVENous Q6H PRN Harshad Manuel MD        polyethylene glycol (GLYCOLAX) packet 17 g  17 g Oral Daily PRN Harshad Manuel MD        enoxaparin

## 2023-10-24 NOTE — ED NOTES
TRANSFER - OUT REPORT:    Verbal report given to Tamar Magaña RN on Rito Baumgarten  being transferred to [address] [place], [place] [address] for routine progression of patient care       Report consisted of patient's Situation, Background, Assessment and   Recommendations(SBAR). Information from the following report(s) Nurse Handoff Report, ED Encounter Summary, ED SBAR, Intake/Output, MAR, Recent Results, Cardiac Rhythm Sinus Tyler Magalis, and Neuro Assessment was reviewed with the receiving nurse. Kinder Fall Assessment:                           Lines:   Peripheral IV 10/24/23 Right; Anterior Forearm (Active)   Site Assessment Clean, dry & intact 10/24/23 0636        Opportunity for questions and clarification was provided.       Patient transported with:  Registered Nurse          Gely Maria RN  10/24/23 0073

## 2023-10-24 NOTE — PROGRESS NOTES
TRANSFER - IN REPORT:    Verbal report received from [facility] RN on [facility] Sees  being received from ED  for routine progression of patient care      Report consisted of patient's Situation, Background, Assessment and   Recommendations(SBAR). Information from the following report(s) Nurse Handoff Report, ED SBAR, MAR, and Cardiac Rhythm ST  was reviewed with the receiving nurse. Opportunity for questions and clarification was provided. Assessment completed upon patient's arrival to unit and care assumed.

## 2023-10-24 NOTE — PROGRESS NOTES
Remdesivir Initiation Note    Remdesivir Pharmacy Consult ordered by Dr. Antonieta Tinoco   3 day course recommended by ID  DOT: 1 of 3       This patient meets criteria for initiation of remdesivir based on the following:  Proven COVID-19  Acceptable hepatic function (ALT within 5 times ULN)                ALT = 15  (10/24/23)     Exclusion Criteria:  Severe disease requiring invasive or non-invasive mechanical ventilation (includes HFNC & BiPAP)  Could consider use in patients requiring high flow if early on in the disease course (based on symptom duration)  Use of more than 1 vasopressor prior to remdesivir initiation  Already improving on supportive treatment and/or impending discharge  Patients in whom the clinical team think death is in the immediate short-term where remdesivir is unlikely to change the clinical outcome     Liver function tests will be monitored daily while on remdesivir.

## 2023-10-24 NOTE — PROGRESS NOTES
Stroke Education provided to patient and the following topics were discussed    1. Patients personal risk factors for stroke are diabetes mellitus    2. Warning signs of Stroke:        * Sudden numbness or weakness of the face, arm or leg, especially on one side of          The body            * Sudden confusion, trouble speaking or understanding        * Sudden trouble seeing in one or both eyes        * Sudden trouble walking, dizziness, loss of balance or coordination        * Sudden severe headache with no known cause      3. Importance of activation Emergency Medical Services ( 9-1-1 ) immediately if experience any warning signs of stroke. 4. Be sure and schedule a follow-up appointment with your primary care doctor or any specialists as instructed. 5. You must take medicine every day to treat your risk factors for stroke. Be sure to take your medicines exactly as your doctor tells you: no more, no less. Know what your medicines are for , what they do. Anti-thrombotics /anticoagulants can help prevent strokes. You are taking the following medicine(s)  aspirin     6. Smoking and second-hand smoke greatly increase your risk of stroke, cardiovascular disease and death. Smoking history never    7. Information provided was BS Stroke Education Binder    8. Documentation of teaching completed in Patient Education Activity and on Care Plan with teaching response noted?   yes

## 2023-10-24 NOTE — PLAN OF CARE
Problem: Discharge Planning  Goal: Discharge to home or other facility with appropriate resources  10/24/2023 1414 by Lynn Pelayo RN  Outcome: Progressing  10/24/2023 1413 by Lynn Pelayo RN  Outcome: Progressing  Flowsheets (Taken 10/24/2023 1215)  Discharge to home or other facility with appropriate resources:   Identify barriers to discharge with patient and caregiver   Arrange for needed discharge resources and transportation as appropriate   Identify discharge learning needs (meds, wound care, etc)

## 2023-10-24 NOTE — PROGRESS NOTES
PT order received and chart reviewed. Pt unable to be seen for skilled PT evaluation; pt GERA for imaging. Will follow up as pt schedule allows.   Babar Manriquez, PT, DPT

## 2023-10-24 NOTE — ED NOTES
Attempted to give report at 56 nurse stated passing meds and will call back.       Gabby Fischer RN  10/24/23 2594

## 2023-10-25 LAB
25(OH)D3 SERPL-MCNC: 57.3 NG/ML (ref 30–100)
ALBUMIN SERPL-MCNC: 2.7 G/DL (ref 3.4–5)
ALBUMIN/GLOB SERPL: 0.7 (ref 0.8–1.7)
ALP SERPL-CCNC: 56 U/L (ref 45–117)
ALT SERPL-CCNC: 13 U/L (ref 16–61)
ANION GAP SERPL CALC-SCNC: 4 MMOL/L (ref 3–18)
AST SERPL-CCNC: 12 U/L (ref 10–38)
BASOPHILS # BLD: 0 K/UL (ref 0–0.1)
BASOPHILS NFR BLD: 1 % (ref 0–2)
BILIRUB SERPL-MCNC: 0.5 MG/DL (ref 0.2–1)
BUN SERPL-MCNC: 14 MG/DL (ref 7–18)
BUN/CREAT SERPL: 10 (ref 12–20)
CALCIUM SERPL-MCNC: 8.5 MG/DL (ref 8.5–10.1)
CHLORIDE SERPL-SCNC: 104 MMOL/L (ref 100–111)
CHOLEST SERPL-MCNC: 103 MG/DL
CO2 SERPL-SCNC: 29 MMOL/L (ref 21–32)
CREAT SERPL-MCNC: 1.46 MG/DL (ref 0.6–1.3)
CRP SERPL-MCNC: 15.9 MG/DL (ref 0–0.3)
DIFFERENTIAL METHOD BLD: ABNORMAL
EOSINOPHIL # BLD: 0 K/UL (ref 0–0.4)
EOSINOPHIL NFR BLD: 0 % (ref 0–5)
ERYTHROCYTE [DISTWIDTH] IN BLOOD BY AUTOMATED COUNT: 12.5 % (ref 11.6–14.5)
EST. AVERAGE GLUCOSE BLD GHB EST-MCNC: 160 MG/DL
GLOBULIN SER CALC-MCNC: 3.7 G/DL (ref 2–4)
GLUCOSE BLD STRIP.AUTO-MCNC: 190 MG/DL (ref 70–110)
GLUCOSE BLD STRIP.AUTO-MCNC: 195 MG/DL (ref 70–110)
GLUCOSE BLD STRIP.AUTO-MCNC: 206 MG/DL (ref 70–110)
GLUCOSE BLD STRIP.AUTO-MCNC: 244 MG/DL (ref 70–110)
GLUCOSE SERPL-MCNC: 169 MG/DL (ref 74–99)
HBA1C MFR BLD: 7.2 % (ref 4.2–5.6)
HCT VFR BLD AUTO: 39 % (ref 36–48)
HDLC SERPL-MCNC: 44 MG/DL (ref 40–60)
HDLC SERPL: 2.3 (ref 0–5)
HGB BLD-MCNC: 13.2 G/DL (ref 13–16)
IMM GRANULOCYTES # BLD AUTO: 0 K/UL (ref 0–0.04)
IMM GRANULOCYTES NFR BLD AUTO: 0 % (ref 0–0.5)
LDLC SERPL CALC-MCNC: 42.2 MG/DL (ref 0–100)
LIPID PANEL: NORMAL
LYMPHOCYTES # BLD: 0.9 K/UL (ref 0.9–3.6)
LYMPHOCYTES NFR BLD: 11 % (ref 21–52)
MCH RBC QN AUTO: 34.9 PG (ref 24–34)
MCHC RBC AUTO-ENTMCNC: 33.8 G/DL (ref 31–37)
MCV RBC AUTO: 103.2 FL (ref 78–100)
MONOCYTES # BLD: 1.4 K/UL (ref 0.05–1.2)
MONOCYTES NFR BLD: 18 % (ref 3–10)
NEUTS SEG # BLD: 5.5 K/UL (ref 1.8–8)
NEUTS SEG NFR BLD: 70 % (ref 40–73)
NRBC # BLD: 0 K/UL (ref 0–0.01)
NRBC BLD-RTO: 0 PER 100 WBC
PLATELET # BLD AUTO: 171 K/UL (ref 135–420)
PMV BLD AUTO: 10 FL (ref 9.2–11.8)
POTASSIUM SERPL-SCNC: 4.4 MMOL/L (ref 3.5–5.5)
PROCALCITONIN SERPL-MCNC: 0.72 NG/ML
PROT SERPL-MCNC: 6.4 G/DL (ref 6.4–8.2)
RBC # BLD AUTO: 3.78 M/UL (ref 4.35–5.65)
SODIUM SERPL-SCNC: 137 MMOL/L (ref 136–145)
TRIGL SERPL-MCNC: 84 MG/DL
TROPONIN I SERPL HS-MCNC: 9 NG/L (ref 0–78)
VLDLC SERPL CALC-MCNC: 16.8 MG/DL
WBC # BLD AUTO: 7.9 K/UL (ref 4.6–13.2)

## 2023-10-25 PROCEDURE — 82962 GLUCOSE BLOOD TEST: CPT

## 2023-10-25 PROCEDURE — 80053 COMPREHEN METABOLIC PANEL: CPT

## 2023-10-25 PROCEDURE — G0378 HOSPITAL OBSERVATION PER HR: HCPCS

## 2023-10-25 PROCEDURE — 6370000000 HC RX 637 (ALT 250 FOR IP): Performed by: INTERNAL MEDICINE

## 2023-10-25 PROCEDURE — 83036 HEMOGLOBIN GLYCOSYLATED A1C: CPT

## 2023-10-25 PROCEDURE — 84145 PROCALCITONIN (PCT): CPT

## 2023-10-25 PROCEDURE — 96366 THER/PROPH/DIAG IV INF ADDON: CPT

## 2023-10-25 PROCEDURE — 85025 COMPLETE CBC W/AUTO DIFF WBC: CPT

## 2023-10-25 PROCEDURE — 97161 PT EVAL LOW COMPLEX 20 MIN: CPT

## 2023-10-25 PROCEDURE — 80061 LIPID PANEL: CPT

## 2023-10-25 PROCEDURE — 6360000002 HC RX W HCPCS: Performed by: INTERNAL MEDICINE

## 2023-10-25 PROCEDURE — 86140 C-REACTIVE PROTEIN: CPT

## 2023-10-25 PROCEDURE — 82306 VITAMIN D 25 HYDROXY: CPT

## 2023-10-25 PROCEDURE — 36415 COLL VENOUS BLD VENIPUNCTURE: CPT

## 2023-10-25 PROCEDURE — 6360000002 HC RX W HCPCS: Performed by: HOSPITALIST

## 2023-10-25 PROCEDURE — 2580000003 HC RX 258: Performed by: INTERNAL MEDICINE

## 2023-10-25 PROCEDURE — 96372 THER/PROPH/DIAG INJ SC/IM: CPT

## 2023-10-25 PROCEDURE — 84484 ASSAY OF TROPONIN QUANT: CPT

## 2023-10-25 PROCEDURE — 6370000000 HC RX 637 (ALT 250 FOR IP): Performed by: HOSPITALIST

## 2023-10-25 RX ORDER — ACETAMINOPHEN 325 MG/1
650 TABLET ORAL EVERY 4 HOURS PRN
Status: DISCONTINUED | OUTPATIENT
Start: 2023-10-25 | End: 2023-10-26 | Stop reason: HOSPADM

## 2023-10-25 RX ADMIN — GUAIFENESIN 600 MG: 600 TABLET, EXTENDED RELEASE ORAL at 09:00

## 2023-10-25 RX ADMIN — METOPROLOL TARTRATE 25 MG: 25 TABLET, FILM COATED ORAL at 09:01

## 2023-10-25 RX ADMIN — ASPIRIN 81 MG: 81 TABLET, CHEWABLE ORAL at 09:00

## 2023-10-25 RX ADMIN — METOPROLOL TARTRATE 25 MG: 25 TABLET, FILM COATED ORAL at 21:22

## 2023-10-25 RX ADMIN — BENZOCAINE AND MENTHOL 1 LOZENGE: 15; 3.6 LOZENGE ORAL at 05:42

## 2023-10-25 RX ADMIN — ENOXAPARIN SODIUM 40 MG: 100 INJECTION SUBCUTANEOUS at 09:00

## 2023-10-25 RX ADMIN — MOMETASONE FUROATE AND FORMOTEROL FUMARATE DIHYDRATE 2 PUFF: 100; 5 AEROSOL RESPIRATORY (INHALATION) at 21:24

## 2023-10-25 RX ADMIN — PANTOPRAZOLE SODIUM 40 MG: 40 TABLET, DELAYED RELEASE ORAL at 06:02

## 2023-10-25 RX ADMIN — GUAIFENESIN 600 MG: 600 TABLET, EXTENDED RELEASE ORAL at 21:22

## 2023-10-25 RX ADMIN — ROSUVASTATIN CALCIUM 40 MG: 10 TABLET, COATED ORAL at 21:22

## 2023-10-25 RX ADMIN — INSULIN GLARGINE 5 UNITS: 100 INJECTION, SOLUTION SUBCUTANEOUS at 09:01

## 2023-10-25 RX ADMIN — MOMETASONE FUROATE AND FORMOTEROL FUMARATE DIHYDRATE 2 PUFF: 100; 5 AEROSOL RESPIRATORY (INHALATION) at 09:02

## 2023-10-25 RX ADMIN — REMDESIVIR 100 MG: 100 INJECTION, POWDER, LYOPHILIZED, FOR SOLUTION INTRAVENOUS at 15:18

## 2023-10-25 RX ADMIN — ACETAMINOPHEN 650 MG: 325 TABLET ORAL at 05:26

## 2023-10-25 RX ADMIN — Medication 2000 UNITS: at 09:01

## 2023-10-25 ASSESSMENT — PAIN SCALES - GENERAL
PAINLEVEL_OUTOF10: 2
PAINLEVEL_OUTOF10: 0
PAINLEVEL_OUTOF10: 1
PAINLEVEL_OUTOF10: 2

## 2023-10-25 ASSESSMENT — PAIN DESCRIPTION - LOCATION
LOCATION: THROAT
LOCATION: THROAT

## 2023-10-25 ASSESSMENT — PAIN DESCRIPTION - DESCRIPTORS: DESCRIPTORS: SORE

## 2023-10-25 NOTE — PROGRESS NOTES
Speech-Language Pathology Screening/Discharge  Order received and chart reviewed. Patient seen today for concerns or slurred speech upon admission. Patient initially asleep, but easily woke to name. Presented alert and oriented x4. Reports that while he did have some slurring of speech, it has since resolved and he is back to his baseline. Speech intelligibility clinically judged to be 100%. No evidence of facial weakness or asymmetry. Expressive/receptive language appear to [facility]. Patient denies difficulty swallowing and reports tolerating regular diet without difficulty. At this time, skilled speech therapy is not medically necessary. ST will d/c off case. Please re consult as needed.    Thank you  DARWIN Ayala.S., CCC-SLP

## 2023-10-25 NOTE — PLAN OF CARE
Problem: Discharge Planning  Goal: Discharge to home or other facility with appropriate resources  Outcome: Progressing  Flowsheets (Taken 10/25/2023 0800)  Discharge to home or other facility with appropriate resources:   Identify barriers to discharge with patient and caregiver   Arrange for needed discharge resources and transportation as appropriate     Problem: ABCDS Injury Assessment  Goal: Absence of physical injury  Outcome: Progressing     Problem: Safety - Adult  Goal: Free from fall injury  Outcome: Progressing     Problem: Pain  Goal: Verbalizes/displays adequate comfort level or baseline comfort level  Outcome: Progressing

## 2023-10-25 NOTE — PROGRESS NOTES
Case Management Assessment  Initial Evaluation    Date/Time of Evaluation: 10/25/2023 11:29 AM  Assessment Completed by: Paul Campos    If patient is discharged prior to next notation, then this note serves as note for discharge by case management. Patient Name: Fredi Harper                   YOB: 1940  Diagnosis: Slurred speech [R47.81]  COVID [U07.1]                   Date / Time: 10/24/2023  6:20 AM    Patient Admission Status: Observation   Readmission Risk (Low < 19, Mod (19-27), High > 27): No data recorded  Current PCP: Ritika Romeo MD  PCP verified by CM? Chart Reviewed: Yes      History Provided by:    Patient Orientation:      Patient Cognition:      Hospitalization in the last 30 days (Readmission):  No    If yes, Readmission Assessment in  Navigator will be completed. Advance Directives:      Code Status: Full Code   Patient's Primary Decision Maker is:        Discharge Planning:    Patient lives with: Spouse/Significant Other Type of Home: Other (Comment)  Primary Care Giver:    Patient Support Systems include: Spouse/Significant Other   Current Financial resources:    Current community resources:    Current services prior to admission: None            Current DME:              Type of Home Care services:  None    ADLS  Prior functional level:    Current functional level:      PT AM-PAC:   /24  OT AM-PAC:   /24    Family can provide assistance at DC: Would you like Case Management to discuss the discharge plan with any other family members/significant others, and if so, who?     Plans to Return to Present Housing:    Other Identified Issues/Barriers to RETURNING to current housing: home when stable  Potential Assistance needed at discharge: N/A            Potential DME:    Patient expects to discharge to: [address] [place] [address] for transportation at discharge: Family    Financial    Payor: MEDICARE / Plan: MEDICARE PART A AND B / Product Type: *No Product type* /     Does

## 2023-10-26 VITALS
SYSTOLIC BLOOD PRESSURE: 135 MMHG | BODY MASS INDEX: 25.22 KG/M2 | TEMPERATURE: 97.8 F | DIASTOLIC BLOOD PRESSURE: 77 MMHG | RESPIRATION RATE: 20 BRPM | HEIGHT: 67 IN | HEART RATE: 97 BPM | WEIGHT: 160.72 LBS | OXYGEN SATURATION: 98 %

## 2023-10-26 LAB
ALBUMIN SERPL-MCNC: 2.6 G/DL (ref 3.4–5)
ALBUMIN/GLOB SERPL: 0.6 (ref 0.8–1.7)
ALP SERPL-CCNC: 64 U/L (ref 45–117)
ALT SERPL-CCNC: 13 U/L (ref 16–61)
ANION GAP SERPL CALC-SCNC: 6 MMOL/L (ref 3–18)
AST SERPL-CCNC: 13 U/L (ref 10–38)
BILIRUB SERPL-MCNC: 0.5 MG/DL (ref 0.2–1)
BUN SERPL-MCNC: 18 MG/DL (ref 7–18)
BUN/CREAT SERPL: 13 (ref 12–20)
CALCIUM SERPL-MCNC: 8.9 MG/DL (ref 8.5–10.1)
CHLORIDE SERPL-SCNC: 106 MMOL/L (ref 100–111)
CO2 SERPL-SCNC: 27 MMOL/L (ref 21–32)
CREAT SERPL-MCNC: 1.41 MG/DL (ref 0.6–1.3)
GLOBULIN SER CALC-MCNC: 4.5 G/DL (ref 2–4)
GLUCOSE BLD STRIP.AUTO-MCNC: 185 MG/DL (ref 70–110)
GLUCOSE BLD STRIP.AUTO-MCNC: 321 MG/DL (ref 70–110)
GLUCOSE SERPL-MCNC: 199 MG/DL (ref 74–99)
POTASSIUM SERPL-SCNC: 4.2 MMOL/L (ref 3.5–5.5)
PROCALCITONIN SERPL-MCNC: 0.51 NG/ML
PROT SERPL-MCNC: 7.1 G/DL (ref 6.4–8.2)
SODIUM SERPL-SCNC: 139 MMOL/L (ref 136–145)

## 2023-10-26 PROCEDURE — 1100000000 HC RM PRIVATE

## 2023-10-26 PROCEDURE — 6370000000 HC RX 637 (ALT 250 FOR IP): Performed by: HOSPITALIST

## 2023-10-26 PROCEDURE — 80053 COMPREHEN METABOLIC PANEL: CPT

## 2023-10-26 PROCEDURE — 36415 COLL VENOUS BLD VENIPUNCTURE: CPT

## 2023-10-26 PROCEDURE — 2580000003 HC RX 258: Performed by: INTERNAL MEDICINE

## 2023-10-26 PROCEDURE — 84145 PROCALCITONIN (PCT): CPT

## 2023-10-26 PROCEDURE — 6360000002 HC RX W HCPCS: Performed by: INTERNAL MEDICINE

## 2023-10-26 PROCEDURE — 96372 THER/PROPH/DIAG INJ SC/IM: CPT

## 2023-10-26 PROCEDURE — G0378 HOSPITAL OBSERVATION PER HR: HCPCS

## 2023-10-26 PROCEDURE — 96366 THER/PROPH/DIAG IV INF ADDON: CPT

## 2023-10-26 PROCEDURE — 6360000002 HC RX W HCPCS: Performed by: HOSPITALIST

## 2023-10-26 PROCEDURE — 82962 GLUCOSE BLOOD TEST: CPT

## 2023-10-26 RX ORDER — ASPIRIN 81 MG/1
81 TABLET, CHEWABLE ORAL DAILY
Qty: 30 TABLET | Refills: 0 | Status: SHIPPED | OUTPATIENT
Start: 2023-10-27

## 2023-10-26 RX ORDER — GUAIFENESIN/DEXTROMETHORPHAN 100-10MG/5
5 SYRUP ORAL EVERY 4 HOURS PRN
Qty: 120 ML | Refills: 0 | Status: SHIPPED | OUTPATIENT
Start: 2023-10-26 | End: 2023-11-05

## 2023-10-26 RX ORDER — ROSUVASTATIN CALCIUM 40 MG/1
40 TABLET, COATED ORAL NIGHTLY
Qty: 30 TABLET | Refills: 0 | Status: SHIPPED | OUTPATIENT
Start: 2023-10-26

## 2023-10-26 RX ADMIN — GUAIFENESIN 600 MG: 600 TABLET, EXTENDED RELEASE ORAL at 10:15

## 2023-10-26 RX ADMIN — METOPROLOL TARTRATE 25 MG: 25 TABLET, FILM COATED ORAL at 10:23

## 2023-10-26 RX ADMIN — ASPIRIN 81 MG: 81 TABLET, CHEWABLE ORAL at 10:14

## 2023-10-26 RX ADMIN — Medication 2000 UNITS: at 10:15

## 2023-10-26 RX ADMIN — INSULIN GLARGINE 5 UNITS: 100 INJECTION, SOLUTION SUBCUTANEOUS at 10:16

## 2023-10-26 RX ADMIN — ENOXAPARIN SODIUM 40 MG: 100 INJECTION SUBCUTANEOUS at 10:15

## 2023-10-26 RX ADMIN — INSULIN LISPRO 6 UNITS: 100 INJECTION, SOLUTION INTRAVENOUS; SUBCUTANEOUS at 13:51

## 2023-10-26 RX ADMIN — PANTOPRAZOLE SODIUM 40 MG: 40 TABLET, DELAYED RELEASE ORAL at 06:30

## 2023-10-26 ASSESSMENT — PAIN SCALES - GENERAL: PAINLEVEL_OUTOF10: 0

## 2023-10-26 NOTE — PROGRESS NOTES
The discharge information has been reviewed with the patient. The patient verbalized understanding. Discharge medications reviewed with the patient and appropriate educational materials and side effects teaching were provided. Pt will be transported home by his wife.

## 2023-10-26 NOTE — PROGRESS NOTES
[facility]  (A Division of [facility])                                                           Date of Admission: 10/24/2023         Reason for Consult: COVID 19 INfection  Referring MD: Dr Siri Ramos         Current Antimicrobials:    Prior Antimicrobials:    Remdesvir IV X3 days-end 10/26 Ceftriaxone X1 -10/24   Allergy to antibiotics: none       Assessment--ID related:     COVID 19 Infection- non hypoxic. Unvaccinated patient. Less than 5 days duration since onset of symptoms. Emphysematous changes on CTA chest.     Elevated CRP 15- due to covid. Procalcitonin low- no urinary complaints and UA with normal WBC- doubt UTI. Had received X1 dose of Ceftriaxone    OLEKSANDR-improving Cr to 1.4    Active Hospital Problems    Diagnosis     Slurred speech [R47.81]     COVID-19 [U07.1]     Tachycardia [R00.0]     Stage 3 chronic kidney disease ([address]) [N18.30]     Diabetes ([address]) [E11.9]     Fatigue [R53.83]         Recommendation -- ID related:     FU with PCP as directed  Can go home - waiting on his Remdesvir infusion   DW with patient and counseled on getting updated COVID 19 vaccine after 2 months    Today's Visit:      \" I want to go home\"  Has no complaints of chest/abdomen or urinary source       Notes/Labs/Cultures and Imaging reports reviewed --elevated CRP     HPI:      Alma Rosa Viveros is a 82 y.o. male with PMH of DM, CKD, unvaccinated for COVID 19. Had cough onset of couple days duration. Chronic running nose, come in with fatigue and slurred speech and fatigue. Evaluated in ED and found to have COVID 19 infection, no PE, elevated Cr. No CRP or procal available at time of exam.    Wife in room, no similar illness at home.      Past Medical History:   Diagnosis Date    Aneurysm ([address])     \" not sure where\"    Back problem     Carotid artery aneurysm ([address]) 09/17/2014    Diabetes ([address]) 2007    oral med    Kidney cysts     Pneumonia     Renal calculus 2014    Rib age-appropriate. No acute findings. Chronic changes in the right thorax as above      Meghan Merida MD  [place] [facility])  Office #:     [phone]  [id number]   Office Fax: [phone]

## 2023-10-26 NOTE — PLAN OF CARE
Problem: Discharge Planning  Goal: Discharge to home or other facility with appropriate resources  Outcome: Progressing  Flowsheets (Taken 10/26/2023 0715)  Discharge to home or other facility with appropriate resources: Identify barriers to discharge with patient and caregiver     Problem: ABCDS Injury Assessment  Goal: Absence of physical injury  Outcome: Progressing     Problem: Safety - Adult  Goal: Free from fall injury  Outcome: Progressing     Problem: Pain  Goal: Verbalizes/displays adequate comfort level or baseline comfort level  Outcome: Progressing     Problem: Chronic Conditions and Co-morbidities  Goal: Patient's chronic conditions and co-morbidity symptoms are monitored and maintained or improved  Outcome: Progressing  Flowsheets (Taken 10/26/2023 0715)  Care Plan - Patient's Chronic Conditions and Co-Morbidity Symptoms are Monitored and Maintained or Improved:   Monitor and assess patient's chronic conditions and comorbid symptoms for stability, deterioration, or improvement   Collaborate with multidisciplinary team to address chronic and comorbid conditions and prevent exacerbation or deterioration   Update acute care plan with appropriate goals if chronic or comorbid symptoms are exacerbated and prevent overall improvement and discharge

## 2023-10-26 NOTE — DISCHARGE INSTRUCTIONS
DISCHARGE SUMMARY from Nurse     PATIENT INSTRUCTIONS:    What to do at Home:  Recommended activity: activity as tolerated    If you experience new or worsening symptoms, return to the nearest Emergency Department. *  Please give a list of your current medications to your Primary Care Provider. *  Please update this list whenever your medications are discontinued, doses are      changed, or new medications (including over-the-counter products) are added. *  Please carry medication information at all times in case of emergency situations. These are general instructions for a healthy lifestyle:    No smoking/ No tobacco products/ Avoid exposure to second hand smoke  Surgeon General's Warning:  Quitting smoking now greatly reduces serious risk to your health.     Obesity, smoking, and sedentary lifestyle greatly increases your risk for illness    A healthy diet, regular physical exercise & weight monitoring are important for maintaining a healthy lifestyle    ___________________________________________________________________________________________________________________________________

## 2023-10-26 NOTE — DISCHARGE SUMMARY
Discharge Summary    Patient: Cherelle Max MRN: [id number]  CSN: [id number]    YOB: 1940  Age: 82 y.o. Sex: male    DOA: 10/24/2023 LOS:  LOS: 0 days   Discharge Date:      Primary Care Provider:  Richardson Haas MD    Admission Diagnoses: Slurred speech [R47.81]  COVID [U07.1]  COVID-19 [U07.1]    Discharge Diagnoses:     Active Hospital Problems    Diagnosis Date Noted    Slurred speech [R47.81] 10/24/2023    COVID-19 [U07.1] 10/24/2023    Tachycardia [R00.0] 10/24/2023    Stage 3 chronic kidney disease ([address]) [N18.30] 10/24/2023    Diabetes ([address]) [E11.9] 09/17/2014    Fatigue [R53.83] 07/27/2013       Discharge Condition: stable     Discharge Medications:        Medication List        START taking these medications      aspirin 81 MG chewable tablet  Take 1 tablet by mouth daily  Start taking on: October 27, 2023     benzocaine-menthol 15-3.6 MG lozenge  Commonly known as: CEPACOL SORE THROAT  Take 1 lozenge by mouth every 2 hours as needed for Sore Throat     guaiFENesin-dextromethorphan 100-10 MG/5ML syrup  Commonly known as: ROBITUSSIN DM  Take 5 mLs by mouth every 4 hours as needed for Cough     rosuvastatin 40 MG tablet  Commonly known as: CRESTOR  Take 1 tablet by mouth nightly            CONTINUE taking these medications      albuterol sulfate  (90 Base) MCG/ACT inhaler  Commonly known as: PROVENTIL;VENTOLIN;PROAIR     Cholecalciferol 50 MCG (2000 UT) Tabs     coenzyme Q10 100 MG Caps capsule     cyanocobalamin 1000 MCG tablet     glipiZIDE 5 MG extended release tablet  Commonly known as: GLUCOTROL XL     lisinopril 5 MG tablet  Commonly known as: PRINIVIL;ZESTRIL     magnesium Oxide 500 MG Tabs     metFORMIN 1000 MG tablet  Commonly known as: GLUCOPHAGE     metoprolol tartrate 25 MG tablet  Commonly known as: LOPRESSOR               Where to Get Your Medications        These medications were sent to [facility] [address], [address] [facility] stroke prevention. He also received PT/OT and his fatigue resolved on dc. Discharge planning discussed with patient, pt agrees  with the plan and no questions and concerns at this point. Activity: activity as tolerated    Diet: regular diet    Follow-up: PCP     Disposition: home     Minutes spent on discharge: 45 min       Labs: Results:       Chemistry Recent Labs     10/24/23  0632 10/25/23  0402 10/26/23  0346   * 137 139   K 4.1 4.4 4.2   CL 98* 104 106   CO2 27 29 27   BUN 16 14 18   GLOB 4.4* 3.7 4.5*      CBC w/Diff Recent Labs     10/24/23  0632 10/25/23  0402   WBC 10.5 7.9   RBC 4.58 3.78*   HGB 15.8 13.2   HCT 46.7 39.0    171      Cardiac Enzymes No results for input(s): \"CPK\", \"IMTIAZ\" in the last 72 hours. Invalid input(s): \"CKRMB\", \"CKND1\", \"TROIP\"   Coagulation No results for input(s): \"INR\", \"APTT\" in the last 72 hours. Invalid input(s): \"PTP\"    Lipid Panel Lab Results   Component Value Date/Time    CHOL 103 10/25/2023 04:02 AM    HDL 44 10/25/2023 04:02 AM      BNP Invalid input(s): \"BNPP\"   Liver Enzymes No results for input(s): \"TP\", \"ALB\" in the last 72 hours. Invalid input(s): \"TBIL\", \"AP\", \"SGOT\", \"GPT\", \"DBIL\"   Thyroid Studies No results found for: \"T4\", \"T3RU\", \"TSH\"         Significant Diagnostic Studies: MRI BRAIN WO CONTRAST    Result Date: 10/24/2023  INDICATION:   slurred speech, gait difficulty EXAMINATION:  MRI BRAIN WO CONTRAST COMPARISON:  CT 10/24/2023 TECHNIQUE:  Multiplanar multisequence acquisition without contrast of the brain. FINDINGS:  Ventricles:  Midline, no hydrocephalus. Brain Parenchyma/Brainstem:  Generalized parenchymal volume loss. Small chronic lacunar infarction right midbrain and minimal chronic T2 hyperintensities in the supratentorial white matter. No acute infarction. Intracranial Hemorrhage:  None. Basal Cisterns:  Normal. Flow Voids:  Normal. Additional Comments:  N/A. No significant abnormality or acute process.      CTA

## 2023-10-26 NOTE — PLAN OF CARE
Problem: Discharge Planning  Goal: Discharge to home or other facility with appropriate resources  10/26/2023 1425 by Tiffanie Corbin RN  Outcome: Adequate for Discharge  10/26/2023 1058 by Tiffanie Corbin RN  Outcome: Progressing  Flowsheets (Taken 10/26/2023 0715)  Discharge to home or other facility with appropriate resources: Identify barriers to discharge with patient and caregiver     Problem: ABCDS Injury Assessment  Goal: Absence of physical injury  10/26/2023 1425 by Tiffanie Corbin RN  Outcome: Adequate for Discharge  10/26/2023 1058 by Tiffanie Corbin RN  Outcome: Progressing     Problem: Safety - Adult  Goal: Free from fall injury  10/26/2023 1425 by Tiffanie Corbin RN  Outcome: Adequate for Discharge  10/26/2023 1058 by Tiffanie Corbin RN  Outcome: Progressing     Problem: Pain  Goal: Verbalizes/displays adequate comfort level or baseline comfort level  10/26/2023 1425 by Tiffanie Corbin RN  Outcome: Adequate for Discharge  10/26/2023 1058 by Tiffanie Corbin RN  Outcome: Progressing     Problem: Chronic Conditions and Co-morbidities  Goal: Patient's chronic conditions and co-morbidity symptoms are monitored and maintained or improved  10/26/2023 1425 by Tiffanie Corbin RN  Outcome: Adequate for Discharge  10/26/2023 1058 by Tiffanie Corbin RN  Outcome: Progressing  Flowsheets (Taken 10/26/2023 0715)  Care Plan - Patient's Chronic Conditions and Co-Morbidity Symptoms are Monitored and Maintained or Improved:   Monitor and assess patient's chronic conditions and comorbid symptoms for stability, deterioration, or improvement   Collaborate with multidisciplinary team to address chronic and comorbid conditions and prevent exacerbation or deterioration   Update acute care plan with appropriate goals if chronic or comorbid symptoms are exacerbated and prevent overall improvement and discharge

## 2024-12-30 ENCOUNTER — HOSPITAL ENCOUNTER (EMERGENCY)
Facility: HOSPITAL | Age: 84
Discharge: LEFT BEFORE DISCHARGE | End: 2024-12-30
Payer: MEDICARE

## 2024-12-30 VITALS
RESPIRATION RATE: 16 BRPM | BODY MASS INDEX: 25.55 KG/M2 | SYSTOLIC BLOOD PRESSURE: 153 MMHG | HEART RATE: 79 BPM | HEIGHT: 66 IN | DIASTOLIC BLOOD PRESSURE: 85 MMHG | WEIGHT: 159 LBS | OXYGEN SATURATION: 99 % | TEMPERATURE: 97.7 F

## 2024-12-30 DIAGNOSIS — E11.65 HYPERGLYCEMIA DUE TO DIABETES MELLITUS (HCC): Primary | ICD-10-CM

## 2024-12-30 LAB
BASOPHILS # BLD: 0.1 K/UL (ref 0–0.1)
BASOPHILS NFR BLD: 1 % (ref 0–2)
DIFFERENTIAL METHOD BLD: ABNORMAL
EOSINOPHIL # BLD: 0.3 K/UL (ref 0–0.4)
EOSINOPHIL NFR BLD: 3 % (ref 0–5)
ERYTHROCYTE [DISTWIDTH] IN BLOOD BY AUTOMATED COUNT: 11.9 % (ref 11.6–14.5)
EST. AVERAGE GLUCOSE BLD GHB EST-MCNC: 171 MG/DL
GLUCOSE BLD STRIP.AUTO-MCNC: 270 MG/DL (ref 70–110)
HBA1C MFR BLD: 7.6 % (ref 4.2–5.6)
HCT VFR BLD AUTO: 41.2 % (ref 36–48)
HGB BLD-MCNC: 14.5 G/DL (ref 13–16)
IMM GRANULOCYTES # BLD AUTO: 0 K/UL (ref 0–0.04)
IMM GRANULOCYTES NFR BLD AUTO: 0 % (ref 0–0.5)
LYMPHOCYTES # BLD: 2.1 K/UL (ref 0.9–3.6)
LYMPHOCYTES NFR BLD: 22 % (ref 21–52)
MCH RBC QN AUTO: 35.1 PG (ref 24–34)
MCHC RBC AUTO-ENTMCNC: 35.2 G/DL (ref 31–37)
MCV RBC AUTO: 99.8 FL (ref 78–100)
MONOCYTES # BLD: 0.8 K/UL (ref 0.05–1.2)
MONOCYTES NFR BLD: 8 % (ref 3–10)
NEUTS SEG # BLD: 6.4 K/UL (ref 1.8–8)
NEUTS SEG NFR BLD: 67 % (ref 40–73)
NRBC # BLD: 0 K/UL (ref 0–0.01)
NRBC BLD-RTO: 0 PER 100 WBC
PLATELET # BLD AUTO: 264 K/UL (ref 135–420)
PMV BLD AUTO: 10.2 FL (ref 9.2–11.8)
RBC # BLD AUTO: 4.13 M/UL (ref 4.35–5.65)
WBC # BLD AUTO: 9.6 K/UL (ref 4.6–13.2)

## 2024-12-30 PROCEDURE — 83036 HEMOGLOBIN GLYCOSYLATED A1C: CPT

## 2024-12-30 PROCEDURE — 99283 EMERGENCY DEPT VISIT LOW MDM: CPT

## 2024-12-30 PROCEDURE — 85025 COMPLETE CBC W/AUTO DIFF WBC: CPT

## 2024-12-30 PROCEDURE — 82962 GLUCOSE BLOOD TEST: CPT

## 2024-12-30 ASSESSMENT — LIFESTYLE VARIABLES
HOW MANY STANDARD DRINKS CONTAINING ALCOHOL DO YOU HAVE ON A TYPICAL DAY: PATIENT DOES NOT DRINK
HOW OFTEN DO YOU HAVE A DRINK CONTAINING ALCOHOL: NEVER

## 2024-12-30 NOTE — ED PROVIDER NOTES
[facility] EMERGENCY DEPT  EMERGENCY DEPARTMENT ENCOUNTER       Pt Name: Bo Cleveland  MRN: [id number]  Birthdate 1940  Date of evaluation: 12/30/2024  PCP: Virginia Barros MD  Note Started: 5:10 PM 12/30/24     CHIEF COMPLAINT       Chief Complaint   Patient presents with    Hyperglycemia        HISTORY OF PRESENT ILLNESS: 1 or more elements      History From: Patient  HPI Limitations: None  Chronic Conditions: DM, renal stone, carotid artery aneurysm  Social Determinants affecting Dx or Tx: none      Bo Cleveland is a 84 y.o. male who presents to ED c/o elevated blood sugar. Pt notes BS was 220 this morning upon waking. He is compliant with metformin and glipizide. He notes feeling \"a little shaky\" this morning but this has since resolved. Last A1c 7.2% per patient. No fever, chills, nausea, vomiting, diarrhea, abdominal pain, dysuria     Nursing Notes were all reviewed and agreed with or any disagreements were addressed in the HPI.    PAST HISTORY     Past Medical History:  Past Medical History:   Diagnosis Date    Aneurysm ([facility])     \" not sure where\"    Back problem     Carotid artery aneurysm ([facility]) 09/17/2014    Diabetes ([facility]) 2007    oral med    Kidney cysts     Pneumonia     Renal calculus 2014    Rib contusion     Sciatica        Past Surgical History:  Past Surgical History:   Procedure Laterality Date    CATARACT REMOVAL Bilateral     HERNIA REPAIR      LITHOTRIPSY  08/07/2014; 2018    x2    ORTHOPEDIC SURGERY      left ankle ligament       Family History:  No family history on file.    Social History:  Social History     Socioeconomic History    Marital status:    Tobacco Use    Smoking status: Never    Smokeless tobacco: Never   Substance and Sexual Activity    Alcohol use: No    Drug use: No       Allergies:  No Known Allergies    CURRENT MEDICATIONS      No current facility-administered medications for this encounter.     Current Outpatient Medications   Medication Sig Dispense Refill

## 2024-12-30 NOTE — ED TRIAGE NOTES
Pt alert and conversational. Ambulated to triage. C/O hyperglycemia.   Requesting shot of insuline.    in triage. Pt states he has not had anything to eat.     Active Ambulatory Problems     Diagnosis Date Noted    Dizziness 02/14/2020    Hypoxemia 07/27/2013    Carotid artery aneurysm (HCC) 09/17/2014    Diabetes ([facility]) 09/17/2014    Calculus of kidney 01/28/2012    Decreased libido 01/28/2012    Erectile dysfunction 01/28/2012    Premature ejaculation 01/28/2012    Fatigue 07/27/2013    Hyperglycemia 02/14/2020    Slurred speech 10/24/2023    COVID-19 10/24/2023    Tachycardia 10/24/2023    Stage 3 chronic kidney disease (HCC) 10/24/2023     Resolved Ambulatory Problems     Diagnosis Date Noted    No Resolved Ambulatory Problems     Past Medical History:   Diagnosis Date    Aneurysm (HCC)     Back problem     Kidney cysts     Pneumonia     Renal calculus 2014    Rib contusion     Sciatica

## 2025-02-03 ENCOUNTER — HOSPITAL ENCOUNTER (OUTPATIENT)
Facility: HOSPITAL | Age: 85
Discharge: HOME | End: 2025-02-06
Payer: MEDICARE

## 2025-02-03 ENCOUNTER — TRANSCRIBE ORDERS (OUTPATIENT)
Facility: HOSPITAL | Age: 85
End: 2025-02-03

## 2025-02-03 DIAGNOSIS — N18.9 CHRONIC KIDNEY DISEASE, UNSPECIFIED CKD STAGE: ICD-10-CM

## 2025-02-03 DIAGNOSIS — N18.9 CHRONIC KIDNEY DISEASE, UNSPECIFIED CKD STAGE: Primary | ICD-10-CM

## 2025-02-03 LAB
ALBUMIN SERPL-MCNC: 3.5 G/DL (ref 3.4–5)
ALBUMIN/GLOB SERPL: 0.9 (ref 0.8–1.7)
ALP SERPL-CCNC: 99 U/L (ref 45–117)
ALT SERPL-CCNC: 20 U/L (ref 16–61)
ANION GAP SERPL CALC-SCNC: 5 MMOL/L (ref 3–18)
AST SERPL-CCNC: 14 U/L (ref 10–38)
BASOPHILS # BLD: 0.05 K/UL (ref 0–0.1)
BASOPHILS NFR BLD: 0.5 % (ref 0–2)
BILIRUB SERPL-MCNC: 0.5 MG/DL (ref 0.2–1)
BUN SERPL-MCNC: 24 MG/DL (ref 7–18)
BUN/CREAT SERPL: 14 (ref 12–20)
CALCIUM SERPL-MCNC: 9.5 MG/DL (ref 8.5–10.1)
CHLORIDE SERPL-SCNC: 103 MMOL/L (ref 100–111)
CO2 SERPL-SCNC: 29 MMOL/L (ref 21–32)
CREAT SERPL-MCNC: 1.69 MG/DL (ref 0.6–1.3)
CREAT UR-MCNC: 158 MG/DL (ref 30–125)
DIFFERENTIAL METHOD BLD: ABNORMAL
EOSINOPHIL # BLD: 0.32 K/UL (ref 0–0.4)
EOSINOPHIL NFR BLD: 3.3 % (ref 0–5)
ERYTHROCYTE [DISTWIDTH] IN BLOOD BY AUTOMATED COUNT: 12.5 % (ref 11.6–14.5)
GLOBULIN SER CALC-MCNC: 3.8 G/DL (ref 2–4)
GLUCOSE SERPL-MCNC: 351 MG/DL (ref 74–99)
HCT VFR BLD AUTO: 38.4 % (ref 36–48)
HGB BLD-MCNC: 12.9 G/DL (ref 13–16)
IMM GRANULOCYTES # BLD AUTO: 0.04 K/UL (ref 0–0.04)
IMM GRANULOCYTES NFR BLD AUTO: 0.4 % (ref 0–0.5)
LYMPHOCYTES # BLD: 2.65 K/UL (ref 0.9–3.3)
LYMPHOCYTES NFR BLD: 27.3 % (ref 21–52)
MAGNESIUM SERPL-MCNC: 1.8 MG/DL (ref 1.6–2.6)
MCH RBC QN AUTO: 35.1 PG (ref 24–34)
MCHC RBC AUTO-ENTMCNC: 33.6 G/DL (ref 31–37)
MCV RBC AUTO: 104.3 FL (ref 78–100)
MICROALBUMIN UR-MCNC: 16.6 MG/DL (ref 0–3)
MICROALBUMIN/CREAT UR-RTO: 105 MG/G (ref 0–30)
MONOCYTES # BLD: 0.8 K/UL (ref 0.05–1.2)
MONOCYTES NFR BLD: 8.2 % (ref 3–10)
NEUTS SEG # BLD: 5.85 K/UL (ref 1.8–8)
NEUTS SEG NFR BLD: 60.3 % (ref 40–73)
NRBC # BLD: 0 K/UL (ref 0–0.01)
NRBC BLD-RTO: 0 PER 100 WBC
PHOSPHATE SERPL-MCNC: 2.9 MG/DL (ref 2.5–4.9)
PLATELET # BLD AUTO: 236 K/UL (ref 135–420)
PMV BLD AUTO: 10.4 FL (ref 9.2–11.8)
POTASSIUM SERPL-SCNC: 4.4 MMOL/L (ref 3.5–5.5)
PROT SERPL-MCNC: 7.3 G/DL (ref 6.4–8.2)
RBC # BLD AUTO: 3.68 M/UL (ref 4.35–5.65)
SODIUM SERPL-SCNC: 137 MMOL/L (ref 136–145)
URATE SERPL-MCNC: 4.4 MG/DL (ref 2.6–7.2)
WBC # BLD AUTO: 9.7 K/UL (ref 4.6–13.2)

## 2025-02-03 PROCEDURE — 84550 ASSAY OF BLOOD/URIC ACID: CPT

## 2025-02-03 PROCEDURE — 80053 COMPREHEN METABOLIC PANEL: CPT

## 2025-02-03 PROCEDURE — 82570 ASSAY OF URINE CREATININE: CPT

## 2025-02-03 PROCEDURE — 84100 ASSAY OF PHOSPHORUS: CPT

## 2025-02-03 PROCEDURE — 85025 COMPLETE CBC W/AUTO DIFF WBC: CPT

## 2025-02-03 PROCEDURE — 83735 ASSAY OF MAGNESIUM: CPT

## 2025-02-03 PROCEDURE — 36415 COLL VENOUS BLD VENIPUNCTURE: CPT

## 2025-02-03 PROCEDURE — 82043 UR ALBUMIN QUANTITATIVE: CPT

## 2025-02-04 LAB
FAX TO NUMBER: NORMAL
TEST RESULTS FAXED TO: NORMAL

## (undated) DEVICE — STRAP,POSITIONING,KNEE/BODY,FOAM,4X60": Brand: MEDLINE

## (undated) DEVICE — 100% SILICONE FOLEY CATHETER,5-10 ML, 2-WAY: Brand: DOVER

## (undated) DEVICE — CATHETER URET L70CM OD6FR OPN END FLEXIMA

## (undated) DEVICE — GARMENT,MEDLINE,DVT,INT,CALF,MED, GEN2: Brand: MEDLINE

## (undated) DEVICE — SEAL ENDOSCP INSTR 7FR BX SELF SEAL

## (undated) DEVICE — TRAP MUCUS SPECIMEN 40ML -- MEDICHOICE

## (undated) DEVICE — CYSTO PACK: Brand: MEDLINE INDUSTRIES, INC.

## (undated) DEVICE — NITINOL STONE RETRIEVAL BASKET: Brand: ZERO TIP

## (undated) DEVICE — SOLUTION IRRIG 3000ML 0.9% SOD CHL FLX CONT 0797208] ICU MEDICAL INC]

## (undated) DEVICE — DUAL LUMEN URETERAL CATHETER

## (undated) DEVICE — GDWIRE 3CM FLX-TIP 0.038X150CM -- BX/5 SENSOR

## (undated) DEVICE — INFLATION DEVICE: Brand: ENCORE 26

## (undated) DEVICE — URETERAL ACCESS SHEATH SET: Brand: NAVIGATOR HD